# Patient Record
Sex: FEMALE | Race: WHITE | Employment: OTHER | URBAN - METROPOLITAN AREA
[De-identification: names, ages, dates, MRNs, and addresses within clinical notes are randomized per-mention and may not be internally consistent; named-entity substitution may affect disease eponyms.]

---

## 2017-01-01 ENCOUNTER — HOSPITAL ENCOUNTER (INPATIENT)
Facility: HOSPITAL | Age: 77
LOS: 21 days | Discharge: HOSPICE | DRG: 264 | End: 2017-01-01
Attending: EMERGENCY MEDICINE | Admitting: INTERNAL MEDICINE
Payer: MEDICARE

## 2017-01-01 ENCOUNTER — APPOINTMENT (OUTPATIENT)
Dept: CT IMAGING | Facility: HOSPITAL | Age: 77
DRG: 264 | End: 2017-01-01
Attending: INTERNAL MEDICINE
Payer: MEDICARE

## 2017-01-01 ENCOUNTER — APPOINTMENT (OUTPATIENT)
Dept: NUCLEAR MEDICINE | Facility: HOSPITAL | Age: 77
DRG: 264 | End: 2017-01-01
Attending: INTERNAL MEDICINE
Payer: MEDICARE

## 2017-01-01 ENCOUNTER — APPOINTMENT (OUTPATIENT)
Dept: CV DIAGNOSTICS | Facility: HOSPITAL | Age: 77
DRG: 264 | End: 2017-01-01
Attending: INTERNAL MEDICINE
Payer: MEDICARE

## 2017-01-01 ENCOUNTER — APPOINTMENT (OUTPATIENT)
Dept: CT IMAGING | Facility: HOSPITAL | Age: 77
DRG: 264 | End: 2017-01-01
Attending: RADIOLOGY
Payer: MEDICARE

## 2017-01-01 ENCOUNTER — TELEPHONE (OUTPATIENT)
Dept: INTERNAL MEDICINE CLINIC | Facility: CLINIC | Age: 77
End: 2017-01-01

## 2017-01-01 ENCOUNTER — APPOINTMENT (OUTPATIENT)
Dept: ULTRASOUND IMAGING | Facility: HOSPITAL | Age: 77
DRG: 264 | End: 2017-01-01
Attending: Other
Payer: MEDICARE

## 2017-01-01 ENCOUNTER — APPOINTMENT (OUTPATIENT)
Dept: GENERAL RADIOLOGY | Facility: HOSPITAL | Age: 77
DRG: 264 | End: 2017-01-01
Attending: EMERGENCY MEDICINE
Payer: MEDICARE

## 2017-01-01 ENCOUNTER — APPOINTMENT (OUTPATIENT)
Dept: ULTRASOUND IMAGING | Facility: HOSPITAL | Age: 77
DRG: 264 | End: 2017-01-01
Attending: INTERNAL MEDICINE
Payer: MEDICARE

## 2017-01-01 ENCOUNTER — SURGERY (OUTPATIENT)
Age: 77
End: 2017-01-01

## 2017-01-01 ENCOUNTER — APPOINTMENT (OUTPATIENT)
Dept: GENERAL RADIOLOGY | Facility: HOSPITAL | Age: 77
DRG: 264 | End: 2017-01-01
Attending: INTERNAL MEDICINE
Payer: MEDICARE

## 2017-01-01 ENCOUNTER — HOSPITAL ENCOUNTER (INPATIENT)
Facility: HOSPITAL | Age: 77
LOS: 4 days | DRG: 308 | End: 2017-01-01
Attending: INTERNAL MEDICINE | Admitting: INTERNAL MEDICINE
Payer: OTHER MISCELLANEOUS

## 2017-01-01 ENCOUNTER — ANESTHESIA (OUTPATIENT)
Dept: ENDOSCOPY | Facility: HOSPITAL | Age: 77
DRG: 264 | End: 2017-01-01
Payer: MEDICARE

## 2017-01-01 ENCOUNTER — APPOINTMENT (OUTPATIENT)
Dept: MRI IMAGING | Facility: HOSPITAL | Age: 77
DRG: 264 | End: 2017-01-01
Attending: Other
Payer: MEDICARE

## 2017-01-01 ENCOUNTER — APPOINTMENT (OUTPATIENT)
Dept: INTERVENTIONAL RADIOLOGY/VASCULAR | Facility: HOSPITAL | Age: 77
DRG: 264 | End: 2017-01-01
Attending: INTERNAL MEDICINE
Payer: MEDICARE

## 2017-01-01 ENCOUNTER — ANESTHESIA EVENT (OUTPATIENT)
Dept: ENDOSCOPY | Facility: HOSPITAL | Age: 77
DRG: 264 | End: 2017-01-01
Payer: MEDICARE

## 2017-01-01 VITALS
HEIGHT: 67 IN | BODY MASS INDEX: 24.66 KG/M2 | DIASTOLIC BLOOD PRESSURE: 51 MMHG | HEART RATE: 95 BPM | OXYGEN SATURATION: 96 % | TEMPERATURE: 98 F | RESPIRATION RATE: 18 BRPM | SYSTOLIC BLOOD PRESSURE: 118 MMHG | WEIGHT: 157.13 LBS

## 2017-01-01 VITALS
OXYGEN SATURATION: 90 % | RESPIRATION RATE: 16 BRPM | HEART RATE: 94 BPM | TEMPERATURE: 98 F | SYSTOLIC BLOOD PRESSURE: 97 MMHG | DIASTOLIC BLOOD PRESSURE: 41 MMHG

## 2017-01-01 DIAGNOSIS — I50.9 ACUTE ON CHRONIC CONGESTIVE HEART FAILURE, UNSPECIFIED CONGESTIVE HEART FAILURE TYPE: ICD-10-CM

## 2017-01-01 DIAGNOSIS — D64.9 ANEMIA, UNSPECIFIED TYPE: ICD-10-CM

## 2017-01-01 DIAGNOSIS — K63.5 COLON POLYP: ICD-10-CM

## 2017-01-01 DIAGNOSIS — K57.90 DIVERTICULOSIS: ICD-10-CM

## 2017-01-01 DIAGNOSIS — G45.1 HEMISPHERIC CAROTID ARTERY SYNDROME: ICD-10-CM

## 2017-01-01 DIAGNOSIS — I48.91 ATRIAL FIBRILLATION WITH RAPID VENTRICULAR RESPONSE (HCC): Primary | ICD-10-CM

## 2017-01-01 DIAGNOSIS — K63.89 COLONIC MASS: ICD-10-CM

## 2017-01-01 LAB
ALBUMIN SERPL BCP-MCNC: 1.9 G/DL (ref 3.5–4.8)
ALBUMIN SERPL BCP-MCNC: 2.1 G/DL (ref 3.5–4.8)
ALBUMIN SERPL BCP-MCNC: 2.1 G/DL (ref 3.5–4.8)
ALBUMIN/GLOB SERPL: 0.4 {RATIO} (ref 1–2)
ALBUMIN/GLOB SERPL: 0.4 {RATIO} (ref 1–2)
ALP SERPL-CCNC: 55 U/L (ref 32–100)
ALP SERPL-CCNC: 56 U/L (ref 32–100)
ALP SERPL-CCNC: 61 U/L (ref 32–100)
ALT SERPL-CCNC: 13 U/L (ref 14–54)
ALT SERPL-CCNC: 14 U/L (ref 14–54)
ALT SERPL-CCNC: 17 U/L (ref 14–54)
ANION GAP SERPL CALC-SCNC: 10 MMOL/L (ref 0–18)
ANION GAP SERPL CALC-SCNC: 10 MMOL/L (ref 0–18)
ANION GAP SERPL CALC-SCNC: 11 MMOL/L (ref 0–18)
ANION GAP SERPL CALC-SCNC: 13 MMOL/L (ref 0–18)
ANION GAP SERPL CALC-SCNC: 4 MMOL/L (ref 0–18)
ANION GAP SERPL CALC-SCNC: 5 MMOL/L (ref 0–18)
ANION GAP SERPL CALC-SCNC: 6 MMOL/L (ref 0–18)
ANION GAP SERPL CALC-SCNC: 6 MMOL/L (ref 0–18)
ANION GAP SERPL CALC-SCNC: 7 MMOL/L (ref 0–18)
ANION GAP SERPL CALC-SCNC: 8 MMOL/L (ref 0–18)
ANION GAP SERPL CALC-SCNC: 9 MMOL/L (ref 0–18)
ANION GAP SERPL CALC-SCNC: 9 MMOL/L (ref 0–18)
ANTIBODY SCREEN: NEGATIVE
APTT PPP: 130.9 SECONDS (ref 23.2–35.3)
APTT PPP: 40.3 SECONDS (ref 23.2–35.3)
APTT PPP: 40.5 SECONDS (ref 23.2–35.3)
APTT PPP: 40.6 SECONDS (ref 23.2–35.3)
APTT PPP: 42 SECONDS (ref 23.2–35.3)
APTT PPP: 57.3 SECONDS (ref 23.2–35.3)
APTT PPP: 80.7 SECONDS (ref 23.2–35.3)
AST SERPL-CCNC: 20 U/L (ref 15–41)
AST SERPL-CCNC: 22 U/L (ref 15–41)
AST SERPL-CCNC: 25 U/L (ref 15–41)
BACTERIA UR QL AUTO: NEGATIVE /HPF
BASOPHILS # BLD: 0 K/UL (ref 0–0.2)
BASOPHILS NFR BLD: 0 %
BASOPHILS NFR BLD: 1 %
BILIRUB DIRECT SERPL-MCNC: 0.3 MG/DL (ref 0–0.2)
BILIRUB SERPL-MCNC: 0.9 MG/DL (ref 0.3–1.2)
BILIRUB SERPL-MCNC: 0.9 MG/DL (ref 0.3–1.2)
BILIRUB SERPL-MCNC: 1.1 MG/DL (ref 0.3–1.2)
BILIRUB UR QL: NEGATIVE
BLOOD TYPE BARCODE: 8400
BNP SERPL-MCNC: 531 PG/ML (ref 0–100)
BUN SERPL-MCNC: 15 MG/DL (ref 8–20)
BUN SERPL-MCNC: 16 MG/DL (ref 8–20)
BUN SERPL-MCNC: 16 MG/DL (ref 8–20)
BUN SERPL-MCNC: 17 MG/DL (ref 8–20)
BUN SERPL-MCNC: 18 MG/DL (ref 8–20)
BUN SERPL-MCNC: 19 MG/DL (ref 8–20)
BUN SERPL-MCNC: 19 MG/DL (ref 8–20)
BUN SERPL-MCNC: 20 MG/DL (ref 8–20)
BUN SERPL-MCNC: 24 MG/DL (ref 8–20)
BUN SERPL-MCNC: 27 MG/DL (ref 8–20)
BUN SERPL-MCNC: 28 MG/DL (ref 8–20)
BUN SERPL-MCNC: 28 MG/DL (ref 8–20)
BUN SERPL-MCNC: 29 MG/DL (ref 8–20)
BUN SERPL-MCNC: 30 MG/DL (ref 8–20)
BUN SERPL-MCNC: 31 MG/DL (ref 8–20)
BUN/CREAT SERPL: 19 (ref 10–20)
BUN/CREAT SERPL: 21.7 (ref 10–20)
BUN/CREAT SERPL: 21.9 (ref 10–20)
BUN/CREAT SERPL: 22.4 (ref 10–20)
BUN/CREAT SERPL: 23.3 (ref 10–20)
BUN/CREAT SERPL: 23.5 (ref 10–20)
BUN/CREAT SERPL: 23.9 (ref 10–20)
BUN/CREAT SERPL: 24.3 (ref 10–20)
BUN/CREAT SERPL: 24.7 (ref 10–20)
BUN/CREAT SERPL: 26.1 (ref 10–20)
BUN/CREAT SERPL: 26.9 (ref 10–20)
BUN/CREAT SERPL: 27.3 (ref 10–20)
BUN/CREAT SERPL: 27.4 (ref 10–20)
BUN/CREAT SERPL: 28.6 (ref 10–20)
BUN/CREAT SERPL: 28.6 (ref 10–20)
C DIFF TOX B STL QL: NEGATIVE
CALCIUM SERPL-MCNC: 7.3 MG/DL (ref 8.5–10.5)
CALCIUM SERPL-MCNC: 7.5 MG/DL (ref 8.5–10.5)
CALCIUM SERPL-MCNC: 7.6 MG/DL (ref 8.5–10.5)
CALCIUM SERPL-MCNC: 7.7 MG/DL (ref 8.5–10.5)
CALCIUM SERPL-MCNC: 7.8 MG/DL (ref 8.5–10.5)
CALCIUM SERPL-MCNC: 7.8 MG/DL (ref 8.5–10.5)
CALCIUM SERPL-MCNC: 7.9 MG/DL (ref 8.5–10.5)
CALCIUM SERPL-MCNC: 8 MG/DL (ref 8.5–10.5)
CALCIUM SERPL-MCNC: 8.1 MG/DL (ref 8.5–10.5)
CD10 CELLS NFR SPEC: 18 %
CD11C CELLS NFR SPEC: 9 %
CD14 CELLS NFR SPEC: 6 %
CD19 CELLS NFR SPEC: 30 %
CD19/CD10 CELLS: 18 %
CD20 CELLS NFR SPEC: 27 %
CD22 CELLS NFR SPEC: 11 %
CD23 CELLS NFR SPEC: 2 %
CD3 CELLS NFR SPEC: 63 %
CD3+CD4+ CELLS NFR SPEC: 41 %
CD3+CD4+ CELLS/CD3+CD8+ CLL SPEC: 2
CD3+CD8+ CELLS NFR SPEC: 21 %
CD45 CELLS NFR SPEC: 100 %
CD5 CELLS NFR SPEC: 62 %
CD5/CD19 CELLS: 2 %
CD56 CELLS NFR SPEC: 10 %
CD7 CELLS NFR SPEC: 67 %
CELL SURF KAPPA/LAMBDA RATIO: 2.5
CELL SURF LAMBDA LIGHT CHAIN: 8 %
CELL SURFACE KAPPA LIGHT CHAIN: 20 %
CHLORIDE SERPL-SCNC: 100 MMOL/L (ref 95–110)
CHLORIDE SERPL-SCNC: 100 MMOL/L (ref 95–110)
CHLORIDE SERPL-SCNC: 101 MMOL/L (ref 95–110)
CHLORIDE SERPL-SCNC: 104 MMOL/L (ref 95–110)
CHLORIDE SERPL-SCNC: 94 MMOL/L (ref 95–110)
CHLORIDE SERPL-SCNC: 95 MMOL/L (ref 95–110)
CHLORIDE SERPL-SCNC: 96 MMOL/L (ref 95–110)
CHLORIDE SERPL-SCNC: 96 MMOL/L (ref 95–110)
CHLORIDE SERPL-SCNC: 97 MMOL/L (ref 95–110)
CHLORIDE SERPL-SCNC: 98 MMOL/L (ref 95–110)
CHLORIDE SERPL-SCNC: 99 MMOL/L (ref 95–110)
CHOLEST SERPL-MCNC: 80 MG/DL (ref 110–200)
CHOLEST SERPL-MCNC: 85 MG/DL (ref 110–200)
CLARITY UR: CLEAR
CO2 SERPL-SCNC: 24 MMOL/L (ref 22–32)
CO2 SERPL-SCNC: 25 MMOL/L (ref 22–32)
CO2 SERPL-SCNC: 27 MMOL/L (ref 22–32)
CO2 SERPL-SCNC: 28 MMOL/L (ref 22–32)
CO2 SERPL-SCNC: 28 MMOL/L (ref 22–32)
CO2 SERPL-SCNC: 30 MMOL/L (ref 22–32)
CO2 SERPL-SCNC: 31 MMOL/L (ref 22–32)
CO2 SERPL-SCNC: 33 MMOL/L (ref 22–32)
CO2 SERPL-SCNC: 34 MMOL/L (ref 22–32)
CO2 SERPL-SCNC: 36 MMOL/L (ref 22–32)
COLOR UR: YELLOW
CREAT SERPL-MCNC: 0.55 MG/DL (ref 0.5–1.5)
CREAT SERPL-MCNC: 0.62 MG/DL (ref 0.5–1.5)
CREAT SERPL-MCNC: 0.73 MG/DL (ref 0.5–1.5)
CREAT SERPL-MCNC: 0.81 MG/DL (ref 0.5–1.5)
CREAT SERPL-MCNC: 0.81 MG/DL (ref 0.5–1.5)
CREAT SERPL-MCNC: 0.83 MG/DL (ref 0.5–1.5)
CREAT SERPL-MCNC: 0.84 MG/DL (ref 0.5–1.5)
CREAT SERPL-MCNC: 0.85 MG/DL (ref 0.5–1.5)
CREAT SERPL-MCNC: 0.96 MG/DL (ref 0.5–1.5)
CREAT SERPL-MCNC: 0.98 MG/DL (ref 0.5–1.5)
CREAT SERPL-MCNC: 1.03 MG/DL (ref 0.5–1.5)
CREAT SERPL-MCNC: 1.05 MG/DL (ref 0.5–1.5)
CREAT SERPL-MCNC: 1.08 MG/DL (ref 0.5–1.5)
CREAT SERPL-MCNC: 1.13 MG/DL (ref 0.5–1.5)
CREAT SERPL-MCNC: 1.15 MG/DL (ref 0.5–1.5)
CREAT SERPL-MCNC: 1.19 MG/DL (ref 0.5–1.5)
DIGOXIN SERPL-MCNC: 1.7 NG/ML (ref 0.8–2.1)
DIGOXIN SERPL-MCNC: 1.9 NG/ML (ref 0.8–2.1)
EOSINOPHIL # BLD: 0 K/UL (ref 0–0.7)
EOSINOPHIL NFR BLD: 0 %
EOSINOPHIL NFR BLD: 1 %
ERYTHROCYTE [DISTWIDTH] IN BLOOD BY AUTOMATED COUNT: 23.6 % (ref 11–15)
ERYTHROCYTE [DISTWIDTH] IN BLOOD BY AUTOMATED COUNT: 23.9 % (ref 11–15)
ERYTHROCYTE [DISTWIDTH] IN BLOOD BY AUTOMATED COUNT: 28.3 % (ref 11–15)
ERYTHROCYTE [DISTWIDTH] IN BLOOD BY AUTOMATED COUNT: 28.3 % (ref 11–15)
ERYTHROCYTE [DISTWIDTH] IN BLOOD BY AUTOMATED COUNT: 28.6 % (ref 11–15)
ERYTHROCYTE [DISTWIDTH] IN BLOOD BY AUTOMATED COUNT: 28.6 % (ref 11–15)
ERYTHROCYTE [DISTWIDTH] IN BLOOD BY AUTOMATED COUNT: 29 % (ref 11–15)
ERYTHROCYTE [DISTWIDTH] IN BLOOD BY AUTOMATED COUNT: 29.1 % (ref 11–15)
ERYTHROCYTE [DISTWIDTH] IN BLOOD BY AUTOMATED COUNT: 29.1 % (ref 11–15)
ERYTHROCYTE [DISTWIDTH] IN BLOOD BY AUTOMATED COUNT: 29.4 % (ref 11–15)
ERYTHROCYTE [DISTWIDTH] IN BLOOD BY AUTOMATED COUNT: 29.5 % (ref 11–15)
ERYTHROCYTE [DISTWIDTH] IN BLOOD BY AUTOMATED COUNT: 29.5 % (ref 11–15)
ERYTHROCYTE [DISTWIDTH] IN BLOOD BY AUTOMATED COUNT: 29.7 % (ref 11–15)
ERYTHROCYTE [DISTWIDTH] IN BLOOD BY AUTOMATED COUNT: 29.8 % (ref 11–15)
ERYTHROCYTE [SEDIMENTATION RATE] IN BLOOD: 110 MM/HR (ref 0–30)
ERYTHROCYTE [SEDIMENTATION RATE] IN BLOOD: 86 MM/HR (ref 0–30)
FERRITIN SERPL IA-MCNC: 121 NG/ML (ref 11–307)
FMC7 CELLS NFR SPEC: 11 %
FOLATE SERPL-MCNC: 11.2 NG/ML
GLOBULIN PLAS-MCNC: 4.3 G/DL (ref 2.5–3.7)
GLOBULIN PLAS-MCNC: 4.7 G/DL (ref 2.5–3.7)
GLUCOSE BLDC GLUCOMTR-MCNC: 100 MG/DL (ref 70–99)
GLUCOSE BLDC GLUCOMTR-MCNC: 100 MG/DL (ref 70–99)
GLUCOSE BLDC GLUCOMTR-MCNC: 101 MG/DL (ref 70–99)
GLUCOSE BLDC GLUCOMTR-MCNC: 103 MG/DL (ref 70–99)
GLUCOSE BLDC GLUCOMTR-MCNC: 105 MG/DL (ref 70–99)
GLUCOSE BLDC GLUCOMTR-MCNC: 109 MG/DL (ref 70–99)
GLUCOSE BLDC GLUCOMTR-MCNC: 109 MG/DL (ref 70–99)
GLUCOSE BLDC GLUCOMTR-MCNC: 113 MG/DL (ref 70–99)
GLUCOSE BLDC GLUCOMTR-MCNC: 116 MG/DL (ref 70–99)
GLUCOSE BLDC GLUCOMTR-MCNC: 118 MG/DL (ref 70–99)
GLUCOSE BLDC GLUCOMTR-MCNC: 125 MG/DL (ref 70–99)
GLUCOSE BLDC GLUCOMTR-MCNC: 126 MG/DL (ref 70–99)
GLUCOSE BLDC GLUCOMTR-MCNC: 129 MG/DL (ref 70–99)
GLUCOSE BLDC GLUCOMTR-MCNC: 132 MG/DL (ref 70–99)
GLUCOSE BLDC GLUCOMTR-MCNC: 132 MG/DL (ref 70–99)
GLUCOSE BLDC GLUCOMTR-MCNC: 141 MG/DL (ref 70–99)
GLUCOSE BLDC GLUCOMTR-MCNC: 146 MG/DL (ref 70–99)
GLUCOSE BLDC GLUCOMTR-MCNC: 202 MG/DL (ref 70–99)
GLUCOSE BLDC GLUCOMTR-MCNC: 84 MG/DL (ref 70–99)
GLUCOSE BLDC GLUCOMTR-MCNC: 88 MG/DL (ref 70–99)
GLUCOSE BLDC GLUCOMTR-MCNC: 89 MG/DL (ref 70–99)
GLUCOSE BLDC GLUCOMTR-MCNC: 92 MG/DL (ref 70–99)
GLUCOSE BLDC GLUCOMTR-MCNC: 93 MG/DL (ref 70–99)
GLUCOSE BLDC GLUCOMTR-MCNC: 98 MG/DL (ref 70–99)
GLUCOSE BLDC GLUCOMTR-MCNC: 99 MG/DL (ref 70–99)
GLUCOSE SERPL-MCNC: 100 MG/DL (ref 70–99)
GLUCOSE SERPL-MCNC: 100 MG/DL (ref 70–99)
GLUCOSE SERPL-MCNC: 103 MG/DL (ref 70–99)
GLUCOSE SERPL-MCNC: 103 MG/DL (ref 70–99)
GLUCOSE SERPL-MCNC: 109 MG/DL (ref 70–99)
GLUCOSE SERPL-MCNC: 111 MG/DL (ref 70–99)
GLUCOSE SERPL-MCNC: 113 MG/DL (ref 70–99)
GLUCOSE SERPL-MCNC: 119 MG/DL (ref 70–99)
GLUCOSE SERPL-MCNC: 120 MG/DL (ref 70–99)
GLUCOSE SERPL-MCNC: 187 MG/DL (ref 70–99)
GLUCOSE SERPL-MCNC: 188 MG/DL (ref 70–99)
GLUCOSE SERPL-MCNC: 90 MG/DL (ref 70–99)
GLUCOSE SERPL-MCNC: 91 MG/DL (ref 70–99)
GLUCOSE SERPL-MCNC: 94 MG/DL (ref 70–99)
GLUCOSE SERPL-MCNC: 97 MG/DL (ref 70–99)
GLUCOSE UR-MCNC: NEGATIVE MG/DL
HBA1C MFR BLD: 5.1 % (ref 4–6)
HBA1C MFR BLD: 5.2 % (ref 4–6)
HCT VFR BLD AUTO: 22.7 % (ref 35–48)
HCT VFR BLD AUTO: 25.7 % (ref 35–48)
HCT VFR BLD AUTO: 26 % (ref 35–48)
HCT VFR BLD AUTO: 26.2 % (ref 35–48)
HCT VFR BLD AUTO: 26.5 % (ref 35–48)
HCT VFR BLD AUTO: 27.4 % (ref 35–48)
HCT VFR BLD AUTO: 27.7 % (ref 35–48)
HCT VFR BLD AUTO: 27.7 % (ref 35–48)
HCT VFR BLD AUTO: 27.9 % (ref 35–48)
HCT VFR BLD AUTO: 28.6 % (ref 35–48)
HCT VFR BLD AUTO: 29.2 % (ref 35–48)
HCT VFR BLD AUTO: 30.4 % (ref 35–48)
HCT VFR BLD AUTO: 30.5 % (ref 35–48)
HCT VFR BLD AUTO: 31.5 % (ref 35–48)
HCT VFR BLD AUTO: 32.1 % (ref 35–48)
HCT VFR BLD AUTO: 32.5 % (ref 35–48)
HCT VFR BLD AUTO: 32.5 % (ref 35–48)
HCT VFR BLD AUTO: 33.4 % (ref 35–48)
HDLC SERPL-MCNC: 13 MG/DL
HDLC SERPL-MCNC: 6 MG/DL
HGB BLD-MCNC: 10 G/DL (ref 12–16)
HGB BLD-MCNC: 10.2 G/DL (ref 12–16)
HGB BLD-MCNC: 6.6 G/DL (ref 12–16)
HGB BLD-MCNC: 7.7 G/DL (ref 12–16)
HGB BLD-MCNC: 8 G/DL (ref 12–16)
HGB BLD-MCNC: 8.4 G/DL (ref 12–16)
HGB BLD-MCNC: 8.4 G/DL (ref 12–16)
HGB BLD-MCNC: 8.5 G/DL (ref 12–16)
HGB BLD-MCNC: 8.6 G/DL (ref 12–16)
HGB BLD-MCNC: 8.8 G/DL (ref 12–16)
HGB BLD-MCNC: 8.8 G/DL (ref 12–16)
HGB BLD-MCNC: 9 G/DL (ref 12–16)
HGB BLD-MCNC: 9.3 G/DL (ref 12–16)
HGB BLD-MCNC: 9.5 G/DL (ref 12–16)
HGB BLD-MCNC: 9.7 G/DL (ref 12–16)
HGB BLD-MCNC: 9.9 G/DL (ref 12–16)
HGB UR QL STRIP.AUTO: NEGATIVE
HGB UR QL STRIP.AUTO: NEGATIVE
HYALINE CASTS #/AREA URNS AUTO: 1 /LPF
HYALINE CASTS #/AREA URNS AUTO: 20 /LPF
IRON SATN MFR SERPL: 11 % (ref 15–50)
IRON SATN MFR SERPL: 3 % (ref 15–50)
IRON SERPL-MCNC: 18 MCG/DL (ref 28–170)
IRON SERPL-MCNC: 6 MCG/DL (ref 28–170)
KETONES UR-MCNC: NEGATIVE MG/DL
LDH SERPL L TO P-CCNC: 235 U/L (ref 98–192)
LDLC SERPL CALC-MCNC: 54 MG/DL (ref 0–99)
LDLC SERPL CALC-MCNC: 55 MG/DL (ref 0–99)
LYMPHOCYTES # BLD: 0.2 K/UL (ref 1–4)
LYMPHOCYTES # BLD: 0.3 K/UL (ref 1–4)
LYMPHOCYTES # BLD: 0.4 K/UL (ref 1–4)
LYMPHOCYTES NFR BLD: 10 %
LYMPHOCYTES NFR BLD: 3 %
LYMPHOCYTES NFR BLD: 5 %
LYMPHOCYTES NFR BLD: 5 %
LYMPHOCYTES NFR BLD: 6 %
LYMPHOCYTES NFR BLD: 7 %
LYMPHOCYTES NFR BLD: 7 %
LYMPHOCYTES NFR BLD: 8 %
LYMPHOCYTES NFR BLD: 9 %
LYMPHOCYTES NFR BLD: 9 %
MAGNESIUM SERPL-MCNC: 1.5 MG/DL (ref 1.8–2.5)
MAGNESIUM SERPL-MCNC: 1.6 MG/DL (ref 1.8–2.5)
MAGNESIUM SERPL-MCNC: 1.7 MG/DL (ref 1.8–2.5)
MAGNESIUM SERPL-MCNC: 1.9 MG/DL (ref 1.8–2.5)
MCH RBC QN AUTO: 17.4 PG (ref 27–32)
MCH RBC QN AUTO: 17.5 PG (ref 27–32)
MCH RBC QN AUTO: 19.8 PG (ref 27–32)
MCH RBC QN AUTO: 19.9 PG (ref 27–32)
MCH RBC QN AUTO: 19.9 PG (ref 27–32)
MCH RBC QN AUTO: 20 PG (ref 27–32)
MCH RBC QN AUTO: 20.1 PG (ref 27–32)
MCH RBC QN AUTO: 20.3 PG (ref 27–32)
MCH RBC QN AUTO: 20.4 PG (ref 27–32)
MCH RBC QN AUTO: 20.4 PG (ref 27–32)
MCH RBC QN AUTO: 20.6 PG (ref 27–32)
MCH RBC QN AUTO: 20.7 PG (ref 27–32)
MCH RBC QN AUTO: 20.8 PG (ref 27–32)
MCHC RBC AUTO-ENTMCNC: 28.9 G/DL (ref 32–37)
MCHC RBC AUTO-ENTMCNC: 29 G/DL (ref 32–37)
MCHC RBC AUTO-ENTMCNC: 29.7 G/DL (ref 32–37)
MCHC RBC AUTO-ENTMCNC: 30 G/DL (ref 32–37)
MCHC RBC AUTO-ENTMCNC: 30.1 G/DL (ref 32–37)
MCHC RBC AUTO-ENTMCNC: 30.1 G/DL (ref 32–37)
MCHC RBC AUTO-ENTMCNC: 30.3 G/DL (ref 32–37)
MCHC RBC AUTO-ENTMCNC: 30.5 G/DL (ref 32–37)
MCHC RBC AUTO-ENTMCNC: 30.5 G/DL (ref 32–37)
MCHC RBC AUTO-ENTMCNC: 30.6 G/DL (ref 32–37)
MCHC RBC AUTO-ENTMCNC: 30.6 G/DL (ref 32–37)
MCHC RBC AUTO-ENTMCNC: 30.7 G/DL (ref 32–37)
MCHC RBC AUTO-ENTMCNC: 30.7 G/DL (ref 32–37)
MCHC RBC AUTO-ENTMCNC: 30.8 G/DL (ref 32–37)
MCHC RBC AUTO-ENTMCNC: 30.9 G/DL (ref 32–37)
MCHC RBC AUTO-ENTMCNC: 30.9 G/DL (ref 32–37)
MCHC RBC AUTO-ENTMCNC: 31 G/DL (ref 32–37)
MCV RBC AUTO: 60.2 FL (ref 80–100)
MCV RBC AUTO: 60.8 FL (ref 80–100)
MCV RBC AUTO: 64.4 FL (ref 80–100)
MCV RBC AUTO: 65.6 FL (ref 80–100)
MCV RBC AUTO: 65.8 FL (ref 80–100)
MCV RBC AUTO: 65.8 FL (ref 80–100)
MCV RBC AUTO: 65.9 FL (ref 80–100)
MCV RBC AUTO: 66 FL (ref 80–100)
MCV RBC AUTO: 66.2 FL (ref 80–100)
MCV RBC AUTO: 66.3 FL (ref 80–100)
MCV RBC AUTO: 66.4 FL (ref 80–100)
MCV RBC AUTO: 66.4 FL (ref 80–100)
MCV RBC AUTO: 66.5 FL (ref 80–100)
MCV RBC AUTO: 66.7 FL (ref 80–100)
MCV RBC AUTO: 67 FL (ref 80–100)
MCV RBC AUTO: 67.3 FL (ref 80–100)
MCV RBC AUTO: 67.3 FL (ref 80–100)
MONOCYTES # BLD: 0.5 K/UL (ref 0–1)
MONOCYTES # BLD: 0.6 K/UL (ref 0–1)
MONOCYTES # BLD: 0.7 K/UL (ref 0–1)
MONOCYTES # BLD: 0.7 K/UL (ref 0–1)
MONOCYTES # BLD: 0.8 K/UL (ref 0–1)
MONOCYTES # BLD: 0.8 K/UL (ref 0–1)
MONOCYTES # BLD: 0.9 K/UL (ref 0–1)
MONOCYTES # BLD: 1 K/UL (ref 0–1)
MONOCYTES NFR BLD: 15 %
MONOCYTES NFR BLD: 16 %
MONOCYTES NFR BLD: 16 %
MONOCYTES NFR BLD: 17 %
MONOCYTES NFR BLD: 17 %
MONOCYTES NFR BLD: 18 %
MONOCYTES NFR BLD: 18 %
MONOCYTES NFR BLD: 19 %
MONOCYTES NFR BLD: 19 %
MONOCYTES NFR BLD: 20 %
MONOCYTES NFR BLD: 21 %
MONOCYTES NFR BLD: 22 %
MONOCYTES NFR BLD: 23 %
NEUTROPHILS # BLD AUTO: 1.8 K/UL (ref 1.8–7.7)
NEUTROPHILS # BLD AUTO: 2 K/UL (ref 1.8–7.7)
NEUTROPHILS # BLD AUTO: 2.2 K/UL (ref 1.8–7.7)
NEUTROPHILS # BLD AUTO: 2.3 K/UL (ref 1.8–7.7)
NEUTROPHILS # BLD AUTO: 2.4 K/UL (ref 1.8–7.7)
NEUTROPHILS # BLD AUTO: 2.5 K/UL (ref 1.8–7.7)
NEUTROPHILS # BLD AUTO: 2.5 K/UL (ref 1.8–7.7)
NEUTROPHILS # BLD AUTO: 2.7 K/UL (ref 1.8–7.7)
NEUTROPHILS # BLD AUTO: 2.7 K/UL (ref 1.8–7.7)
NEUTROPHILS # BLD AUTO: 2.9 K/UL (ref 1.8–7.7)
NEUTROPHILS # BLD AUTO: 2.9 K/UL (ref 1.8–7.7)
NEUTROPHILS # BLD AUTO: 3 K/UL (ref 1.8–7.7)
NEUTROPHILS # BLD AUTO: 3 K/UL (ref 1.8–7.7)
NEUTROPHILS # BLD AUTO: 3.1 K/UL (ref 1.8–7.7)
NEUTROPHILS # BLD AUTO: 3.8 K/UL (ref 1.8–7.7)
NEUTROPHILS # BLD AUTO: 4.3 K/UL (ref 1.8–7.7)
NEUTROPHILS NFR BLD: 67 %
NEUTROPHILS NFR BLD: 68 %
NEUTROPHILS NFR BLD: 69 %
NEUTROPHILS NFR BLD: 70 %
NEUTROPHILS NFR BLD: 70 %
NEUTROPHILS NFR BLD: 71 %
NEUTROPHILS NFR BLD: 72 %
NEUTROPHILS NFR BLD: 72 %
NEUTROPHILS NFR BLD: 73 %
NEUTROPHILS NFR BLD: 74 %
NEUTROPHILS NFR BLD: 77 %
NEUTROPHILS NFR BLD: 77 %
NEUTROPHILS NFR BLD: 79 %
NEUTROPHILS NFR BLD: 79 %
NITRITE UR QL STRIP.AUTO: NEGATIVE
NONHDLC SERPL-MCNC: 72 MG/DL
NONHDLC SERPL-MCNC: 74 MG/DL
OSMOLALITY UR CALC.SUM OF ELEC: 277 MOSM/KG (ref 275–295)
OSMOLALITY UR CALC.SUM OF ELEC: 277 MOSM/KG (ref 275–295)
OSMOLALITY UR CALC.SUM OF ELEC: 278 MOSM/KG (ref 275–295)
OSMOLALITY UR CALC.SUM OF ELEC: 279 MOSM/KG (ref 275–295)
OSMOLALITY UR CALC.SUM OF ELEC: 280 MOSM/KG (ref 275–295)
OSMOLALITY UR CALC.SUM OF ELEC: 283 MOSM/KG (ref 275–295)
OSMOLALITY UR CALC.SUM OF ELEC: 287 MOSM/KG (ref 275–295)
OSMOLALITY UR CALC.SUM OF ELEC: 287 MOSM/KG (ref 275–295)
OSMOLALITY UR CALC.SUM OF ELEC: 288 MOSM/KG (ref 275–295)
OSMOLALITY UR CALC.SUM OF ELEC: 291 MOSM/KG (ref 275–295)
OSMOLALITY UR CALC.SUM OF ELEC: 292 MOSM/KG (ref 275–295)
OSMOLALITY UR CALC.SUM OF ELEC: 292 MOSM/KG (ref 275–295)
OSMOLALITY UR CALC.SUM OF ELEC: 295 MOSM/KG (ref 275–295)
PH UR: 6 [PH] (ref 5–8)
PH UR: 6 [PH] (ref 5–8)
PH UR: 7 [PH] (ref 5–8)
PLATELET # BLD AUTO: 141 K/UL (ref 140–400)
PLATELET # BLD AUTO: 141 K/UL (ref 140–400)
PLATELET # BLD AUTO: 143 K/UL (ref 140–400)
PLATELET # BLD AUTO: 145 K/UL (ref 140–400)
PLATELET # BLD AUTO: 146 K/UL (ref 140–400)
PLATELET # BLD AUTO: 155 K/UL (ref 140–400)
PLATELET # BLD AUTO: 155 K/UL (ref 140–400)
PLATELET # BLD AUTO: 163 K/UL (ref 140–400)
PLATELET # BLD AUTO: 172 K/UL (ref 140–400)
PLATELET # BLD AUTO: 173 K/UL (ref 140–400)
PLATELET # BLD AUTO: 183 K/UL (ref 140–400)
PLATELET # BLD AUTO: 183 K/UL (ref 140–400)
PLATELET # BLD AUTO: 189 K/UL (ref 140–400)
PLATELET # BLD AUTO: 189 K/UL (ref 140–400)
PLATELET # BLD AUTO: 196 K/UL (ref 140–400)
PLATELET # BLD AUTO: 204 K/UL (ref 140–400)
PLATELET # BLD AUTO: 207 K/UL (ref 140–400)
PMV BLD AUTO: 10.1 FL (ref 7.4–10.3)
PMV BLD AUTO: 10.2 FL (ref 7.4–10.3)
PMV BLD AUTO: 10.3 FL (ref 7.4–10.3)
PMV BLD AUTO: 10.4 FL (ref 7.4–10.3)
PMV BLD AUTO: 10.4 FL (ref 7.4–10.3)
PMV BLD AUTO: 10.6 FL (ref 7.4–10.3)
PMV BLD AUTO: 10.8 FL (ref 7.4–10.3)
PMV BLD AUTO: 9.3 FL (ref 7.4–10.3)
PMV BLD AUTO: 9.6 FL (ref 7.4–10.3)
PMV BLD AUTO: 9.6 FL (ref 7.4–10.3)
PMV BLD AUTO: 9.7 FL (ref 7.4–10.3)
PMV BLD AUTO: 9.8 FL (ref 7.4–10.3)
PMV BLD AUTO: 9.8 FL (ref 7.4–10.3)
PMV BLD AUTO: 9.9 FL (ref 7.4–10.3)
POTASSIUM SERPL-SCNC: 2.9 MMOL/L (ref 3.3–5.1)
POTASSIUM SERPL-SCNC: 3.1 MMOL/L (ref 3.3–5.1)
POTASSIUM SERPL-SCNC: 3.2 MMOL/L (ref 3.3–5.1)
POTASSIUM SERPL-SCNC: 3.3 MMOL/L (ref 3.3–5.1)
POTASSIUM SERPL-SCNC: 3.4 MMOL/L (ref 3.3–5.1)
POTASSIUM SERPL-SCNC: 3.4 MMOL/L (ref 3.3–5.1)
POTASSIUM SERPL-SCNC: 3.5 MMOL/L (ref 3.3–5.1)
POTASSIUM SERPL-SCNC: 3.6 MMOL/L (ref 3.3–5.1)
POTASSIUM SERPL-SCNC: 3.6 MMOL/L (ref 3.3–5.1)
POTASSIUM SERPL-SCNC: 3.7 MMOL/L (ref 3.3–5.1)
POTASSIUM SERPL-SCNC: 3.8 MMOL/L (ref 3.3–5.1)
POTASSIUM SERPL-SCNC: 3.8 MMOL/L (ref 3.3–5.1)
POTASSIUM SERPL-SCNC: 3.9 MMOL/L (ref 3.3–5.1)
POTASSIUM SERPL-SCNC: 4 MMOL/L (ref 3.3–5.1)
POTASSIUM SERPL-SCNC: 4.2 MMOL/L (ref 3.3–5.1)
POTASSIUM SERPL-SCNC: 4.5 MMOL/L (ref 3.3–5.1)
PROT SERPL-MCNC: 6.2 G/DL (ref 5.9–8.4)
PROT SERPL-MCNC: 6.8 G/DL (ref 5.9–8.4)
PROT SERPL-MCNC: 6.8 G/DL (ref 5.9–8.4)
PROT UR-MCNC: NEGATIVE MG/DL
RBC # BLD AUTO: 3.77 M/UL (ref 3.7–5.4)
RBC # BLD AUTO: 3.82 M/UL (ref 3.7–5.4)
RBC # BLD AUTO: 3.88 M/UL (ref 3.7–5.4)
RBC # BLD AUTO: 3.91 M/UL (ref 3.7–5.4)
RBC # BLD AUTO: 4.14 M/UL (ref 3.7–5.4)
RBC # BLD AUTO: 4.16 M/UL (ref 3.7–5.4)
RBC # BLD AUTO: 4.17 M/UL (ref 3.7–5.4)
RBC # BLD AUTO: 4.22 M/UL (ref 3.7–5.4)
RBC # BLD AUTO: 4.31 M/UL (ref 3.7–5.4)
RBC # BLD AUTO: 4.37 M/UL (ref 3.7–5.4)
RBC # BLD AUTO: 4.41 M/UL (ref 3.7–5.4)
RBC # BLD AUTO: 4.55 M/UL (ref 3.7–5.4)
RBC # BLD AUTO: 4.64 M/UL (ref 3.7–5.4)
RBC # BLD AUTO: 4.78 M/UL (ref 3.7–5.4)
RBC # BLD AUTO: 4.85 M/UL (ref 3.7–5.4)
RBC # BLD AUTO: 4.94 M/UL (ref 3.7–5.4)
RBC # BLD AUTO: 5.05 M/UL (ref 3.7–5.4)
RBC #/AREA URNS AUTO: 2 /HPF
RH BLOOD TYPE: POSITIVE
SODIUM SERPL-SCNC: 132 MMOL/L (ref 136–144)
SODIUM SERPL-SCNC: 133 MMOL/L (ref 136–144)
SODIUM SERPL-SCNC: 133 MMOL/L (ref 136–144)
SODIUM SERPL-SCNC: 134 MMOL/L (ref 136–144)
SODIUM SERPL-SCNC: 134 MMOL/L (ref 136–144)
SODIUM SERPL-SCNC: 135 MMOL/L (ref 136–144)
SODIUM SERPL-SCNC: 135 MMOL/L (ref 136–144)
SODIUM SERPL-SCNC: 136 MMOL/L (ref 136–144)
SODIUM SERPL-SCNC: 137 MMOL/L (ref 136–144)
SODIUM SERPL-SCNC: 138 MMOL/L (ref 136–144)
SODIUM SERPL-SCNC: 140 MMOL/L (ref 136–144)
SP GR UR STRIP: 1.01 (ref 1–1.03)
TIBC SERPL-MCNC: 162 MCG/DL (ref 228–428)
TIBC SERPL-MCNC: 202 MCG/DL (ref 228–428)
TRANSFERRIN SERPL-MCNC: 123 MG/DL (ref 192–382)
TRANSFERRIN SERPL-MCNC: 123 MG/DL (ref 192–382)
TRANSFERRIN SERPL-MCNC: 153 MG/DL (ref 192–382)
TRIGL SERPL-MCNC: 100 MG/DL (ref 1–149)
TRIGL SERPL-MCNC: 86 MG/DL (ref 1–149)
TROPONIN I SERPL-MCNC: 0.01 NG/ML (ref ?–0.03)
TROPONIN I SERPL-MCNC: 0.01 NG/ML (ref ?–0.03)
TSH SERPL-ACNC: 4.69 UIU/ML (ref 0.34–5.6)
UROBILINOGEN UR STRIP-ACNC: <2
VIT B12 SERPL-MCNC: >1500 PG/ML (ref 181–914)
VIT C UR-MCNC: NEGATIVE MG/DL
WBC # BLD AUTO: 2.7 K/UL (ref 4–11)
WBC # BLD AUTO: 3 K/UL (ref 4–11)
WBC # BLD AUTO: 3.1 K/UL (ref 4–11)
WBC # BLD AUTO: 3.2 K/UL (ref 4–11)
WBC # BLD AUTO: 3.3 K/UL (ref 4–11)
WBC # BLD AUTO: 3.5 K/UL (ref 4–11)
WBC # BLD AUTO: 3.7 K/UL (ref 4–11)
WBC # BLD AUTO: 3.8 K/UL (ref 4–11)
WBC # BLD AUTO: 3.9 K/UL (ref 4–11)
WBC # BLD AUTO: 4.2 K/UL (ref 4–11)
WBC # BLD AUTO: 4.3 K/UL (ref 4–11)
WBC # BLD AUTO: 4.8 K/UL (ref 4–11)
WBC # BLD AUTO: 5.4 K/UL (ref 4–11)
WBC #/AREA URNS AUTO: 1 /HPF
WBC #/AREA URNS AUTO: 1 /HPF
WBC #/AREA URNS AUTO: 5 /HPF
WBC #/AREA URNS AUTO: 9 /HPF

## 2017-01-01 PROCEDURE — 93018 CV STRESS TEST I&R ONLY: CPT | Performed by: NUCLEAR MEDICINE

## 2017-01-01 PROCEDURE — 99233 SBSQ HOSP IP/OBS HIGH 50: CPT | Performed by: INTERNAL MEDICINE

## 2017-01-01 PROCEDURE — 99232 SBSQ HOSP IP/OBS MODERATE 35: CPT | Performed by: INTERNAL MEDICINE

## 2017-01-01 PROCEDURE — 93018 CV STRESS TEST I&R ONLY: CPT | Performed by: INTERNAL MEDICINE

## 2017-01-01 PROCEDURE — 70450 CT HEAD/BRAIN W/O DYE: CPT

## 2017-01-01 PROCEDURE — 71260 CT THORAX DX C+: CPT

## 2017-01-01 PROCEDURE — 99231 SBSQ HOSP IP/OBS SF/LOW 25: CPT | Performed by: INTERNAL MEDICINE

## 2017-01-01 PROCEDURE — 70551 MRI BRAIN STEM W/O DYE: CPT

## 2017-01-01 PROCEDURE — 94729 DIFFUSING CAPACITY: CPT | Performed by: INTERNAL MEDICINE

## 2017-01-01 PROCEDURE — 93880 EXTRACRANIAL BILAT STUDY: CPT

## 2017-01-01 PROCEDURE — 71020 XR CHEST PA + LAT CHEST (CPT=71020): CPT

## 2017-01-01 PROCEDURE — 0DBM8ZZ EXCISION OF DESCENDING COLON, VIA NATURAL OR ARTIFICIAL OPENING ENDOSCOPIC: ICD-10-PCS | Performed by: INTERNAL MEDICINE

## 2017-01-01 PROCEDURE — 93017 CV STRESS TEST TRACING ONLY: CPT

## 2017-01-01 PROCEDURE — 99223 1ST HOSP IP/OBS HIGH 75: CPT | Performed by: NURSE PRACTITIONER

## 2017-01-01 PROCEDURE — 99223 1ST HOSP IP/OBS HIGH 75: CPT | Performed by: INTERNAL MEDICINE

## 2017-01-01 PROCEDURE — 76856 US EXAM PELVIC COMPLETE: CPT

## 2017-01-01 PROCEDURE — 99223 1ST HOSP IP/OBS HIGH 75: CPT | Performed by: OTHER

## 2017-01-01 PROCEDURE — 99222 1ST HOSP IP/OBS MODERATE 55: CPT | Performed by: INTERNAL MEDICINE

## 2017-01-01 PROCEDURE — 0DBK8ZZ EXCISION OF ASCENDING COLON, VIA NATURAL OR ARTIFICIAL OPENING ENDOSCOPIC: ICD-10-PCS | Performed by: INTERNAL MEDICINE

## 2017-01-01 PROCEDURE — B5181ZA FLUOROSCOPY OF SUPERIOR VENA CAVA USING LOW OSMOLAR CONTRAST, GUIDANCE: ICD-10-PCS | Performed by: INTERNAL MEDICINE

## 2017-01-01 PROCEDURE — 99238 HOSP IP/OBS DSCHRG MGMT 30/<: CPT | Performed by: INTERNAL MEDICINE

## 2017-01-01 PROCEDURE — 93016 CV STRESS TEST SUPVJ ONLY: CPT | Performed by: INTERNAL MEDICINE

## 2017-01-01 PROCEDURE — 94060 EVALUATION OF WHEEZING: CPT | Performed by: INTERNAL MEDICINE

## 2017-01-01 PROCEDURE — 77012 CT SCAN FOR NEEDLE BIOPSY: CPT

## 2017-01-01 PROCEDURE — 07BC3ZX EXCISION OF PELVIS LYMPHATIC, PERCUTANEOUS APPROACH, DIAGNOSTIC: ICD-10-PCS | Performed by: RADIOLOGY

## 2017-01-01 PROCEDURE — 38505 NEEDLE BIOPSY LYMPH NODES: CPT

## 2017-01-01 PROCEDURE — 99232 SBSQ HOSP IP/OBS MODERATE 35: CPT | Performed by: OTHER

## 2017-01-01 PROCEDURE — 99233 SBSQ HOSP IP/OBS HIGH 50: CPT | Performed by: NURSE PRACTITIONER

## 2017-01-01 PROCEDURE — 93306 TTE W/DOPPLER COMPLETE: CPT | Performed by: INTERNAL MEDICINE

## 2017-01-01 PROCEDURE — 93306 TTE W/DOPPLER COMPLETE: CPT

## 2017-01-01 PROCEDURE — 74177 CT ABD & PELVIS W/CONTRAST: CPT

## 2017-01-01 PROCEDURE — 49180 BIOPSY ABDOMINAL MASS: CPT

## 2017-01-01 PROCEDURE — 02HV33Z INSERTION OF INFUSION DEVICE INTO SUPERIOR VENA CAVA, PERCUTANEOUS APPROACH: ICD-10-PCS | Performed by: INTERNAL MEDICINE

## 2017-01-01 PROCEDURE — 0DBP8ZX EXCISION OF RECTUM, VIA NATURAL OR ARTIFICIAL OPENING ENDOSCOPIC, DIAGNOSTIC: ICD-10-PCS | Performed by: INTERNAL MEDICINE

## 2017-01-01 PROCEDURE — 78452 HT MUSCLE IMAGE SPECT MULT: CPT

## 2017-01-01 PROCEDURE — 71010 XR CHEST AP PORTABLE  (CPT=71010): CPT

## 2017-01-01 PROCEDURE — 0DBN8ZX EXCISION OF SIGMOID COLON, VIA NATURAL OR ARTIFICIAL OPENING ENDOSCOPIC, DIAGNOSTIC: ICD-10-PCS | Performed by: INTERNAL MEDICINE

## 2017-01-01 PROCEDURE — 94726 PLETHYSMOGRAPHY LUNG VOLUMES: CPT | Performed by: INTERNAL MEDICINE

## 2017-01-01 RX ORDER — ASPIRIN 81 MG/1
81 TABLET ORAL DAILY
Status: DISCONTINUED | OUTPATIENT
Start: 2017-01-01 | End: 2017-01-01

## 2017-01-01 RX ORDER — ONDANSETRON 4 MG/1
4 TABLET, ORALLY DISINTEGRATING ORAL EVERY 4 HOURS PRN
Status: DISCONTINUED | OUTPATIENT
Start: 2017-01-01 | End: 2017-01-01

## 2017-01-01 RX ORDER — IPRATROPIUM BROMIDE AND ALBUTEROL SULFATE 2.5; .5 MG/3ML; MG/3ML
3 SOLUTION RESPIRATORY (INHALATION)
Status: DISCONTINUED | OUTPATIENT
Start: 2017-01-01 | End: 2017-01-01

## 2017-01-01 RX ORDER — FUROSEMIDE 40 MG/1
40 TABLET ORAL
Status: DISCONTINUED | OUTPATIENT
Start: 2017-01-01 | End: 2017-01-01

## 2017-01-01 RX ORDER — METOLAZONE 2.5 MG/1
2.5 TABLET ORAL DAILY
Status: DISCONTINUED | OUTPATIENT
Start: 2017-01-01 | End: 2017-01-01

## 2017-01-01 RX ORDER — LORAZEPAM 2 MG/ML
1 INJECTION INTRAMUSCULAR EVERY 4 HOURS PRN
Status: DISCONTINUED | OUTPATIENT
Start: 2017-01-01 | End: 2017-01-01

## 2017-01-01 RX ORDER — METOLAZONE 2.5 MG/1
2.5 TABLET ORAL ONCE
Status: COMPLETED | OUTPATIENT
Start: 2017-01-01 | End: 2017-01-01

## 2017-01-01 RX ORDER — MIDAZOLAM HYDROCHLORIDE 1 MG/ML
INJECTION INTRAMUSCULAR; INTRAVENOUS
Status: COMPLETED
Start: 2017-01-01 | End: 2017-01-01

## 2017-01-01 RX ORDER — DEXTROSE MONOHYDRATE 25 G/50ML
50 INJECTION, SOLUTION INTRAVENOUS AS NEEDED
Status: DISCONTINUED | OUTPATIENT
Start: 2017-01-01 | End: 2017-01-01

## 2017-01-01 RX ORDER — MAGNESIUM OXIDE 400 MG (241.3 MG MAGNESIUM) TABLET
800 TABLET ONCE
Status: COMPLETED | OUTPATIENT
Start: 2017-01-01 | End: 2017-01-01

## 2017-01-01 RX ORDER — MAGNESIUM OXIDE 400 MG (241.3 MG MAGNESIUM) TABLET
400 TABLET ONCE
Status: COMPLETED | OUTPATIENT
Start: 2017-01-01 | End: 2017-01-01

## 2017-01-01 RX ORDER — DILTIAZEM HYDROCHLORIDE 60 MG/1
60 TABLET, FILM COATED ORAL AS NEEDED
Status: DISCONTINUED | OUTPATIENT
Start: 2017-01-01 | End: 2017-01-01

## 2017-01-01 RX ORDER — SODIUM CHLORIDE 0.9 % (FLUSH) 0.9 %
SYRINGE (ML) INJECTION
Status: COMPLETED
Start: 2017-01-01 | End: 2017-01-01

## 2017-01-01 RX ORDER — DOCUSATE SODIUM 100 MG/1
100 CAPSULE, LIQUID FILLED ORAL 2 TIMES DAILY
Status: DISCONTINUED | OUTPATIENT
Start: 2017-01-01 | End: 2017-01-01

## 2017-01-01 RX ORDER — FUROSEMIDE 10 MG/ML
40 INJECTION INTRAMUSCULAR; INTRAVENOUS
Status: DISCONTINUED | OUTPATIENT
Start: 2017-01-01 | End: 2017-01-01

## 2017-01-01 RX ORDER — MIDAZOLAM HYDROCHLORIDE 1 MG/ML
INJECTION INTRAMUSCULAR; INTRAVENOUS
Status: DISCONTINUED
Start: 2017-01-01 | End: 2017-01-01

## 2017-01-01 RX ORDER — 0.9 % SODIUM CHLORIDE 0.9 %
3 VIAL (ML) INJECTION AS NEEDED
Status: DISCONTINUED | OUTPATIENT
Start: 2017-01-01 | End: 2017-01-01

## 2017-01-01 RX ORDER — SODIUM CHLORIDE 9 MG/ML
INJECTION, SOLUTION INTRAVENOUS CONTINUOUS
Status: DISCONTINUED | OUTPATIENT
Start: 2017-01-01 | End: 2017-01-01

## 2017-01-01 RX ORDER — SCOLOPAMINE TRANSDERMAL SYSTEM 1 MG/1
1 PATCH, EXTENDED RELEASE TRANSDERMAL
Status: DISCONTINUED | OUTPATIENT
Start: 2017-01-01 | End: 2017-01-01

## 2017-01-01 RX ORDER — MAGNESIUM OXIDE 400 MG (241.3 MG MAGNESIUM) TABLET
400 TABLET ONCE
Status: DISCONTINUED | OUTPATIENT
Start: 2017-01-01 | End: 2017-01-01

## 2017-01-01 RX ORDER — DILTIAZEM HYDROCHLORIDE 5 MG/ML
INJECTION INTRAVENOUS
Status: COMPLETED
Start: 2017-01-01 | End: 2017-01-01

## 2017-01-01 RX ORDER — POTASSIUM CHLORIDE 14.9 MG/ML
20 INJECTION INTRAVENOUS ONCE
Status: COMPLETED | OUTPATIENT
Start: 2017-01-01 | End: 2017-01-01

## 2017-01-01 RX ORDER — METOLAZONE 2.5 MG/1
2.5 TABLET ORAL
Status: DISCONTINUED | OUTPATIENT
Start: 2017-01-01 | End: 2017-01-01

## 2017-01-01 RX ORDER — 0.9 % SODIUM CHLORIDE 0.9 %
VIAL (ML) INJECTION
Status: DISPENSED
Start: 2017-01-01 | End: 2017-01-01

## 2017-01-01 RX ORDER — CIPROFLOXACIN 500 MG/1
500 TABLET, FILM COATED ORAL EVERY 12 HOURS SCHEDULED
Qty: 6 TABLET | Refills: 0 | Status: SHIPPED | OUTPATIENT
Start: 2017-01-01 | End: 2017-01-01

## 2017-01-01 RX ORDER — GLYCOPYRROLATE 0.2 MG/ML
0.4 INJECTION, SOLUTION INTRAMUSCULAR; INTRAVENOUS
Status: DISCONTINUED | OUTPATIENT
Start: 2017-01-01 | End: 2017-01-01

## 2017-01-01 RX ORDER — HEPARIN SODIUM (PORCINE) LOCK FLUSH IV SOLN 100 UNIT/ML 100 UNIT/ML
1.5 SOLUTION INTRAVENOUS ONCE
Status: DISCONTINUED | OUTPATIENT
Start: 2017-01-01 | End: 2017-01-01

## 2017-01-01 RX ORDER — MIDAZOLAM HYDROCHLORIDE 1 MG/ML
INJECTION INTRAMUSCULAR; INTRAVENOUS AS NEEDED
Status: DISCONTINUED | OUTPATIENT
Start: 2017-01-01 | End: 2017-01-01 | Stop reason: SURG

## 2017-01-01 RX ORDER — POLYETHYLENE GLYCOL 3350 17 G/17G
17 POWDER, FOR SOLUTION ORAL DAILY
Qty: 7 EACH | Refills: 0 | Status: SHIPPED | OUTPATIENT
Start: 2017-01-01 | End: 2017-03-06

## 2017-01-01 RX ORDER — SODIUM CHLORIDE 9 MG/ML
INJECTION, SOLUTION INTRAVENOUS ONCE
Status: COMPLETED | OUTPATIENT
Start: 2017-01-01 | End: 2017-01-01

## 2017-01-01 RX ORDER — ACETAMINOPHEN 325 MG/1
650 TABLET ORAL EVERY 6 HOURS PRN
Status: DISCONTINUED | OUTPATIENT
Start: 2017-01-01 | End: 2017-01-01

## 2017-01-01 RX ORDER — SODIUM CHLORIDE 0.9 % (FLUSH) 0.9 %
10 SYRINGE (ML) INJECTION AS NEEDED
Status: DISCONTINUED | OUTPATIENT
Start: 2017-01-01 | End: 2017-01-01 | Stop reason: HOSPADM

## 2017-01-01 RX ORDER — IPRATROPIUM BROMIDE AND ALBUTEROL SULFATE 2.5; .5 MG/3ML; MG/3ML
3 SOLUTION RESPIRATORY (INHALATION) EVERY 4 HOURS PRN
Status: DISCONTINUED | OUTPATIENT
Start: 2017-01-01 | End: 2017-01-01

## 2017-01-01 RX ORDER — POTASSIUM CHLORIDE 20 MEQ/1
20 TABLET, EXTENDED RELEASE ORAL DAILY
Qty: 30 TABLET | Refills: 0 | Status: SHIPPED | OUTPATIENT
Start: 2017-01-01

## 2017-01-01 RX ORDER — MORPHINE SULFATE IN 0.9 % NACL 1 MG/ML
1 PLASTIC BAG, INJECTION (ML) INTRAVENOUS CONTINUOUS PRN
Status: DISCONTINUED | OUTPATIENT
Start: 2017-01-01 | End: 2017-01-01

## 2017-01-01 RX ORDER — POLYETHYLENE GLYCOL 3350 17 G/17G
17 POWDER, FOR SOLUTION ORAL DAILY
Status: DISCONTINUED | OUTPATIENT
Start: 2017-01-01 | End: 2017-01-01

## 2017-01-01 RX ORDER — POTASSIUM CHLORIDE 29.8 MG/ML
40 INJECTION INTRAVENOUS ONCE
Status: COMPLETED | OUTPATIENT
Start: 2017-01-01 | End: 2017-01-01

## 2017-01-01 RX ORDER — SODIUM CHLORIDE 9 MG/ML
INJECTION, SOLUTION INTRAVENOUS
Status: COMPLETED
Start: 2017-01-01 | End: 2017-01-01

## 2017-01-01 RX ORDER — POTASSIUM CHLORIDE 20 MEQ/1
40 TABLET, EXTENDED RELEASE ORAL ONCE
Status: DISCONTINUED | OUTPATIENT
Start: 2017-01-01 | End: 2017-01-01

## 2017-01-01 RX ORDER — POTASSIUM CHLORIDE 14.9 MG/ML
INJECTION INTRAVENOUS
Status: COMPLETED
Start: 2017-01-01 | End: 2017-01-01

## 2017-01-01 RX ORDER — METOLAZONE 2.5 MG/1
2.5 TABLET ORAL DAILY
Status: COMPLETED | OUTPATIENT
Start: 2017-01-01 | End: 2017-01-01

## 2017-01-01 RX ORDER — HYOSCYAMINE SULFATE 0.125 MG
0.12 TABLET,DISINTEGRATING ORAL 4 TIMES DAILY PRN
Status: DISCONTINUED | OUTPATIENT
Start: 2017-01-01 | End: 2017-01-01

## 2017-01-01 RX ORDER — BACITRACIN 50000 [USP'U]/1
INJECTION, POWDER, LYOPHILIZED, FOR SOLUTION INTRAMUSCULAR
Status: COMPLETED
Start: 2017-01-01 | End: 2017-01-01

## 2017-01-01 RX ORDER — LORAZEPAM 2 MG/ML
0.5 INJECTION INTRAMUSCULAR EVERY 4 HOURS PRN
Status: DISCONTINUED | OUTPATIENT
Start: 2017-01-01 | End: 2017-01-01

## 2017-01-01 RX ORDER — 0.9 % SODIUM CHLORIDE 0.9 %
VIAL (ML) INJECTION
Status: COMPLETED
Start: 2017-01-01 | End: 2017-01-01

## 2017-01-01 RX ORDER — ASPIRIN 81 MG/1
81 TABLET ORAL DAILY
Qty: 30 TABLET | Refills: 0 | Status: SHIPPED | OUTPATIENT
Start: 2017-01-01

## 2017-01-01 RX ORDER — MORPHINE SULFATE 2 MG/ML
1 INJECTION, SOLUTION INTRAMUSCULAR; INTRAVENOUS
Status: DISCONTINUED | OUTPATIENT
Start: 2017-01-01 | End: 2017-01-01

## 2017-01-01 RX ORDER — ACETAMINOPHEN 160 MG/5ML
650 SOLUTION ORAL EVERY 6 HOURS PRN
Status: DISCONTINUED | OUTPATIENT
Start: 2017-01-01 | End: 2017-01-01

## 2017-01-01 RX ORDER — HALOPERIDOL 5 MG/ML
1 INJECTION INTRAMUSCULAR
Status: DISCONTINUED | OUTPATIENT
Start: 2017-01-01 | End: 2017-01-01

## 2017-01-01 RX ORDER — DILTIAZEM HYDROCHLORIDE 5 MG/ML
10 INJECTION INTRAVENOUS
Status: DISCONTINUED | OUTPATIENT
Start: 2017-01-01 | End: 2017-01-01

## 2017-01-01 RX ORDER — ZOLPIDEM TARTRATE 5 MG/1
5 TABLET ORAL NIGHTLY PRN
Status: DISCONTINUED | OUTPATIENT
Start: 2017-01-01 | End: 2017-01-01

## 2017-01-01 RX ORDER — HEPARIN SODIUM AND DEXTROSE 10000; 5 [USP'U]/100ML; G/100ML
12 INJECTION INTRAVENOUS ONCE
Status: COMPLETED | OUTPATIENT
Start: 2017-01-01 | End: 2017-01-01

## 2017-01-01 RX ORDER — POTASSIUM CHLORIDE 20 MEQ/1
40 TABLET, EXTENDED RELEASE ORAL EVERY 4 HOURS
Status: COMPLETED | OUTPATIENT
Start: 2017-01-01 | End: 2017-01-01

## 2017-01-01 RX ORDER — DIGOXIN 125 MCG
125 TABLET ORAL
Qty: 12 TABLET | Refills: 0 | Status: SHIPPED | OUTPATIENT
Start: 2017-01-01

## 2017-01-01 RX ORDER — ACETAMINOPHEN 650 MG/1
650 SUPPOSITORY RECTAL EVERY 6 HOURS SCHEDULED
Status: DISCONTINUED | OUTPATIENT
Start: 2017-01-01 | End: 2017-01-01

## 2017-01-01 RX ORDER — MIDAZOLAM HYDROCHLORIDE 1 MG/ML
4 INJECTION INTRAMUSCULAR; INTRAVENOUS ONCE
Status: COMPLETED | OUTPATIENT
Start: 2017-01-01 | End: 2017-01-01

## 2017-01-01 RX ORDER — DIGOXIN 0.25 MG/ML
250 INJECTION INTRAMUSCULAR; INTRAVENOUS
Status: DISPENSED | OUTPATIENT
Start: 2017-01-01 | End: 2017-01-01

## 2017-01-01 RX ORDER — CIPROFLOXACIN 250 MG/1
250 TABLET, FILM COATED ORAL EVERY 12 HOURS SCHEDULED
Status: DISCONTINUED | OUTPATIENT
Start: 2017-01-01 | End: 2017-01-01

## 2017-01-01 RX ORDER — FUROSEMIDE 10 MG/ML
40 INJECTION INTRAMUSCULAR; INTRAVENOUS 3 TIMES DAILY
Status: DISCONTINUED | OUTPATIENT
Start: 2017-01-01 | End: 2017-01-01

## 2017-01-01 RX ORDER — LORAZEPAM 2 MG/ML
2 INJECTION INTRAMUSCULAR EVERY 4 HOURS PRN
Status: DISCONTINUED | OUTPATIENT
Start: 2017-01-01 | End: 2017-01-01

## 2017-01-01 RX ORDER — HEPARIN SODIUM 1000 [USP'U]/ML
60 INJECTION, SOLUTION INTRAVENOUS; SUBCUTANEOUS ONCE
Status: COMPLETED | OUTPATIENT
Start: 2017-01-01 | End: 2017-01-01

## 2017-01-01 RX ORDER — HALOPERIDOL 5 MG/ML
2 INJECTION INTRAMUSCULAR
Status: DISCONTINUED | OUTPATIENT
Start: 2017-01-01 | End: 2017-01-01

## 2017-01-01 RX ORDER — POTASSIUM CHLORIDE 20 MEQ/1
40 TABLET, EXTENDED RELEASE ORAL EVERY 4 HOURS
Status: DISPENSED | OUTPATIENT
Start: 2017-01-01 | End: 2017-01-01

## 2017-01-01 RX ORDER — SODIUM CHLORIDE 9 MG/ML
INJECTION, SOLUTION INTRAVENOUS
Status: DISPENSED
Start: 2017-01-01 | End: 2017-01-01

## 2017-01-01 RX ORDER — ONDANSETRON 4 MG/1
4 TABLET, ORALLY DISINTEGRATING ORAL EVERY 6 HOURS PRN
Status: DISCONTINUED | OUTPATIENT
Start: 2017-01-01 | End: 2017-01-01

## 2017-01-01 RX ORDER — NALOXONE HYDROCHLORIDE 0.4 MG/ML
80 INJECTION, SOLUTION INTRAMUSCULAR; INTRAVENOUS; SUBCUTANEOUS AS NEEDED
Status: ACTIVE | OUTPATIENT
Start: 2017-01-01 | End: 2017-01-01

## 2017-01-01 RX ORDER — PSEUDOEPHEDRINE HCL 30 MG
100 TABLET ORAL 2 TIMES DAILY
Qty: 60 CAPSULE | Refills: 0 | Status: SHIPPED | OUTPATIENT
Start: 2017-01-01

## 2017-01-01 RX ORDER — POTASSIUM CHLORIDE 750 MG/1
10 TABLET, EXTENDED RELEASE ORAL 2 TIMES DAILY
Qty: 60 TABLET | Refills: 0 | Status: SHIPPED | OUTPATIENT
Start: 2017-01-01 | End: 2017-01-01

## 2017-01-01 RX ORDER — MORPHINE SULFATE IN 0.9 % NACL 1 MG/ML
0.5 PLASTIC BAG, INJECTION (ML) INTRAVENOUS CONTINUOUS PRN
Status: DISCONTINUED | OUTPATIENT
Start: 2017-01-01 | End: 2017-01-01

## 2017-01-01 RX ORDER — ACETAMINOPHEN 650 MG/1
650 SUPPOSITORY RECTAL EVERY 6 HOURS PRN
Status: DISCONTINUED | OUTPATIENT
Start: 2017-01-01 | End: 2017-01-01

## 2017-01-01 RX ORDER — ALBUTEROL SULFATE 2.5 MG/3ML
2.5 SOLUTION RESPIRATORY (INHALATION) EVERY 4 HOURS PRN
Status: DISCONTINUED | OUTPATIENT
Start: 2017-01-01 | End: 2017-01-01

## 2017-01-01 RX ORDER — HEPARIN SODIUM (PORCINE) LOCK FLUSH IV SOLN 100 UNIT/ML 100 UNIT/ML
SOLUTION INTRAVENOUS
Status: COMPLETED
Start: 2017-01-01 | End: 2017-01-01

## 2017-01-01 RX ORDER — ARIPIPRAZOLE 15 MG/1
40 TABLET ORAL ONCE
Status: COMPLETED | OUTPATIENT
Start: 2017-01-01 | End: 2017-01-01

## 2017-01-01 RX ORDER — DIGOXIN 0.25 MG/ML
250 INJECTION INTRAMUSCULAR; INTRAVENOUS EVERY 6 HOURS
Status: COMPLETED | OUTPATIENT
Start: 2017-01-01 | End: 2017-01-01

## 2017-01-01 RX ORDER — MORPHINE SULFATE 100 MG/5ML
5 SOLUTION ORAL EVERY 4 HOURS PRN
Status: DISCONTINUED | OUTPATIENT
Start: 2017-01-01 | End: 2017-01-01

## 2017-01-01 RX ORDER — SODIUM CHLORIDE 0.9 % (FLUSH) 0.9 %
10 SYRINGE (ML) INJECTION AS NEEDED
Status: DISCONTINUED | OUTPATIENT
Start: 2017-01-01 | End: 2017-01-01

## 2017-01-01 RX ORDER — ONDANSETRON 2 MG/ML
4 INJECTION INTRAMUSCULAR; INTRAVENOUS EVERY 4 HOURS PRN
Status: DISCONTINUED | OUTPATIENT
Start: 2017-01-01 | End: 2017-01-01

## 2017-01-01 RX ORDER — DILTIAZEM HYDROCHLORIDE 5 MG/ML
10 INJECTION INTRAVENOUS
Status: DISPENSED | OUTPATIENT
Start: 2017-01-01 | End: 2017-01-01

## 2017-01-01 RX ORDER — CIPROFLOXACIN 500 MG/1
500 TABLET, FILM COATED ORAL EVERY 12 HOURS SCHEDULED
Status: COMPLETED | OUTPATIENT
Start: 2017-01-01 | End: 2017-01-01

## 2017-01-01 RX ORDER — LIDOCAINE HYDROCHLORIDE AND EPINEPHRINE 10; 10 MG/ML; UG/ML
INJECTION, SOLUTION INFILTRATION; PERINEURAL
Status: COMPLETED
Start: 2017-01-01 | End: 2017-01-01

## 2017-01-01 RX ORDER — ONDANSETRON 2 MG/ML
4 INJECTION INTRAMUSCULAR; INTRAVENOUS EVERY 6 HOURS PRN
Status: DISCONTINUED | OUTPATIENT
Start: 2017-01-01 | End: 2017-01-01

## 2017-01-01 RX ORDER — MELATONIN
325 2 TIMES DAILY WITH MEALS
Status: DISCONTINUED | OUTPATIENT
Start: 2017-01-01 | End: 2017-01-01

## 2017-01-01 RX ORDER — DILTIAZEM HYDROCHLORIDE 60 MG/1
60 TABLET, FILM COATED ORAL EVERY 6 HOURS SCHEDULED
Status: DISCONTINUED | OUTPATIENT
Start: 2017-01-01 | End: 2017-01-01

## 2017-01-01 RX ORDER — MIDAZOLAM HYDROCHLORIDE 1 MG/ML
2 INJECTION INTRAMUSCULAR; INTRAVENOUS EVERY 5 MIN PRN
Status: DISCONTINUED | OUTPATIENT
Start: 2017-01-01 | End: 2017-01-01

## 2017-01-01 RX ORDER — BISACODYL 10 MG
10 SUPPOSITORY, RECTAL RECTAL
Status: DISCONTINUED | OUTPATIENT
Start: 2017-01-01 | End: 2017-01-01

## 2017-01-01 RX ORDER — ATROPINE SULFATE 10 MG/ML
2 SOLUTION/ DROPS OPHTHALMIC EVERY 2 HOUR PRN
Status: DISCONTINUED | OUTPATIENT
Start: 2017-01-01 | End: 2017-01-01

## 2017-01-01 RX ORDER — ACETAMINOPHEN 160 MG/5ML
650 SOLUTION ORAL EVERY 6 HOURS SCHEDULED
Status: DISCONTINUED | OUTPATIENT
Start: 2017-01-01 | End: 2017-01-01

## 2017-01-01 RX ORDER — PANTOPRAZOLE SODIUM 40 MG/1
40 TABLET, DELAYED RELEASE ORAL
Status: DISCONTINUED | OUTPATIENT
Start: 2017-01-01 | End: 2017-01-01

## 2017-01-01 RX ORDER — LOPERAMIDE HYDROCHLORIDE 2 MG/1
2 CAPSULE ORAL 4 TIMES DAILY PRN
Status: DISCONTINUED | OUTPATIENT
Start: 2017-01-01 | End: 2017-01-01

## 2017-01-01 RX ORDER — TORSEMIDE 20 MG/1
20 TABLET ORAL
Status: DISCONTINUED | OUTPATIENT
Start: 2017-01-01 | End: 2017-01-01

## 2017-01-01 RX ORDER — SODIUM CHLORIDE, SODIUM LACTATE, POTASSIUM CHLORIDE, CALCIUM CHLORIDE 600; 310; 30; 20 MG/100ML; MG/100ML; MG/100ML; MG/100ML
INJECTION, SOLUTION INTRAVENOUS CONTINUOUS
Status: DISCONTINUED | OUTPATIENT
Start: 2017-01-01 | End: 2017-01-01

## 2017-01-01 RX ORDER — POTASSIUM CHLORIDE 14.9 MG/ML
20 INJECTION INTRAVENOUS ONCE
Status: DISCONTINUED | OUTPATIENT
Start: 2017-01-01 | End: 2017-01-01

## 2017-01-01 RX ORDER — DIGOXIN 125 MCG
125 TABLET ORAL
Status: DISCONTINUED | OUTPATIENT
Start: 2017-01-01 | End: 2017-01-01

## 2017-01-01 RX ORDER — TORSEMIDE 20 MG/1
20 TABLET ORAL
Qty: 60 TABLET | Refills: 0 | Status: SHIPPED | OUTPATIENT
Start: 2017-01-01 | End: 2017-01-01

## 2017-01-01 RX ORDER — METOLAZONE 2.5 MG/1
2.5 TABLET ORAL
Qty: 10 TABLET | Refills: 0 | Status: SHIPPED | OUTPATIENT
Start: 2017-01-01 | End: 2017-01-01

## 2017-01-01 RX ORDER — HEPARIN SODIUM AND DEXTROSE 10000; 5 [USP'U]/100ML; G/100ML
INJECTION INTRAVENOUS CONTINUOUS
Status: DISCONTINUED | OUTPATIENT
Start: 2017-01-01 | End: 2017-01-01

## 2017-01-01 RX ORDER — FUROSEMIDE 10 MG/ML
40 INJECTION INTRAMUSCULAR; INTRAVENOUS ONCE
Status: COMPLETED | OUTPATIENT
Start: 2017-01-01 | End: 2017-01-01

## 2017-01-01 RX ORDER — FUROSEMIDE 40 MG/1
40 TABLET ORAL
Qty: 60 TABLET | Refills: 0 | Status: SHIPPED | OUTPATIENT
Start: 2017-01-01

## 2017-01-01 RX ORDER — LIDOCAINE 50 MG/G
1 PATCH TOPICAL EVERY 24 HOURS
Status: DISCONTINUED | OUTPATIENT
Start: 2017-01-01 | End: 2017-01-01

## 2017-01-01 RX ORDER — DIGOXIN 125 MCG
125 TABLET ORAL DAILY
Qty: 30 TABLET | Refills: 0 | Status: SHIPPED | OUTPATIENT
Start: 2017-01-01 | End: 2017-01-01

## 2017-01-01 RX ORDER — DIGOXIN 125 MCG
125 TABLET ORAL DAILY
Status: DISCONTINUED | OUTPATIENT
Start: 2017-01-01 | End: 2017-01-01

## 2017-01-01 RX ADMIN — MIDAZOLAM HYDROCHLORIDE 1 MG: 1 INJECTION INTRAMUSCULAR; INTRAVENOUS at 12:55:00

## 2017-01-01 RX ADMIN — MIDAZOLAM HYDROCHLORIDE 1 MG: 1 INJECTION INTRAMUSCULAR; INTRAVENOUS at 12:50:00

## 2017-01-01 RX ADMIN — SODIUM CHLORIDE: 9 INJECTION, SOLUTION INTRAVENOUS at 13:29:00

## 2017-01-01 RX ADMIN — SODIUM CHLORIDE: 9 INJECTION, SOLUTION INTRAVENOUS at 12:53:00

## 2017-02-08 PROBLEM — R06.02 SOB (SHORTNESS OF BREATH) ON EXERTION: Status: ACTIVE | Noted: 2017-01-01

## 2017-02-08 PROBLEM — I50.9 ACUTE CHF (CONGESTIVE HEART FAILURE) (HCC): Status: ACTIVE | Noted: 2017-01-01

## 2017-02-08 PROBLEM — D64.9 ANEMIA, UNSPECIFIED TYPE: Status: ACTIVE | Noted: 2017-01-01

## 2017-02-08 PROBLEM — K92.2 GI BLEED: Status: ACTIVE | Noted: 2017-01-01

## 2017-02-08 PROBLEM — I48.91 ATRIAL FIBRILLATION WITH RAPID VENTRICULAR RESPONSE (HCC): Status: ACTIVE | Noted: 2017-01-01

## 2017-02-08 PROBLEM — R73.9 HYPERGLYCEMIA: Status: ACTIVE | Noted: 2017-01-01

## 2017-02-08 PROBLEM — R60.9 EDEMA: Status: ACTIVE | Noted: 2017-01-01

## 2017-02-08 PROBLEM — I50.9 ACUTE ON CHRONIC CONGESTIVE HEART FAILURE, UNSPECIFIED CONGESTIVE HEART FAILURE TYPE: Status: ACTIVE | Noted: 2017-01-01

## 2017-02-08 PROBLEM — E11.9 DM (DIABETES MELLITUS) (HCC): Status: ACTIVE | Noted: 2017-01-01

## 2017-02-08 PROBLEM — R79.89 AZOTEMIA: Status: ACTIVE | Noted: 2017-01-01

## 2017-02-08 PROBLEM — E87.1 HYPONATREMIA: Status: ACTIVE | Noted: 2017-01-01

## 2017-02-08 NOTE — CONSULTS
[facility] HOSP - [facility]    Report of Consultation    Agnes Olson Patient Status:  Emergency    3/23/1940 MRN [id number]   Location [address] Attending Ariadna Gann MD   Hosp Day # 0 PCP Nalini Gutierrez MD elevated BNP. Patient denies chest pain fever chills or coughing. Patient has no prior cardiac history. EKG shows low voltage with A. fib prior inferior and anterior infarcts with Q waves. She has noted a decreased appetite lately.   She does not feel h the 24 hours ending 02/08/17 9719   This shift:         Scheduled Meds:       Physical Exam:           General:  Alert and oriented x 3.   Short of breath with minimal activity   HEENT:  Normocephalic, anicteric sclera, neck supple, no thyromegaly or adenop

## 2017-02-08 NOTE — ED NOTES
Attempted to call report, RN states she can't take patient on a cardizem drip. TINO Majano states TINO Lawrence in ER is aware of issue, trying to find new nurse.

## 2017-02-08 NOTE — ED PROVIDER NOTES
Patient Seen in: [facility] Emergency Department    History   Patient presents with:  Dyspnea MEREDITH SOB (respiratory)    Stated Complaint: sob    HPI    The patient is a 65-year-old female who presents to the emergency department with 2-3 days of in Temp src 02/08/17 1347 Oral   SpO2 02/08/17 1347 100 %   O2 Device 02/08/17 1347 Nasal cannula       Current:/79 mmHg  Pulse 147  Temp(Src) 99.7 °F (37.6 °C) (Oral)  Resp 28  Ht 167.6 cm (5' 6\")  Wt 83.915 kg  BMI 29.87 kg/m2  SpO2 96%        Phys the following:     HGB 7.7 (*)     HCT 26.5 (*)     MCV 60.8 (*)     MCH 17.5 (*)     MCHC 28.9 (*)     RDW 23.9 (*)     Lymphocyte Absolute 0.3 (*)     ANISOCYTOSIS 2+ (*)     Hypochromia 2+ (*)     Microcytosis 2+ (*)     All other components within norm with right basilar pleural effusion.            Radiology exams  Viewed and reviewed by myself and findings discussed with patient including need for follow up    I spent a total of 30 minutes of critical care time in obtaining history, performing a physica

## 2017-02-08 NOTE — CONSULTS
[facility] HOSP - [facility]    Report of Consultation    [facility] Patient Status:  Emergency    3/23/1940 MRN [id number]   Location [address] Attending Moi Nicholas MD   Hosp Day # 0 PCP Ronan Loo MD mg, Intravenous, Q1H PRN  •  diltiazem 100mg/100ml in NaCl (CARDIZEM) IVPB add-vantage, 10 mg/hr, Intravenous, Continuous    Review of Systems: 10 point ROS discussed with patient.  Positives include that which is stated in HPI  Physical Exam:    /66 basilar pleural effusion. Assessment/Plan:    1. Profound microcytic anemia consistent with iron deficiency anemia. In light of OB + stool, occult GI source of blood loss needs to be considered and ruled out.   2. Acute onset AF with rapid V r

## 2017-02-09 NOTE — H&P
[facility] HOSP - [facility]    History and Physical    Flora Young Patient Status:  Inpatient    3/23/1940 MRN [id number]   Location [facility] 3W/SW Attending Maria D Gold MD   Hosp Day # 0 PCP Martha Barr MD     Date:  2017  Emily Rowe Smokeless Status: Never Used                        Alcohol Use: Yes                Comment: 1 glass, wine, occasionally    Allergies/Medications:    Allergies: No Known Allergies    Prescriptions prior to admission:  PROAIR  (90 BASE) MCG/ACT Neurological: She is oriented to person, place, and time. Skin: Skin is warm.      Cervical Papanicolao to     Results:     Lab Results  Component Value Date   WBC 5.4 02/08/2017   HGB 7.7* 02/08/2017   HCT 26.5* 02/08/2017    02/08/2017   CREATS but will also have to r/o a lesion/mass                      PHYSICIAN Certification of Need for Inpatient Hospitalization - Initial Certification      Patient will require inpatient services that will reasonably be expected to span two midnight's based on

## 2017-02-09 NOTE — PROGRESS NOTES
[facility]    GI Progress Note      Vira Barcenas Patient Status:  Inpatient    3/23/1940 MRN [id number]   Location [facility] 3W/SW Attending Lui Sebastian MD   Hosp Day # 1 PCP Hayden Alves MD          SUBJECTIVE:     Carina Hendricks Weight loss and bilateral LE edema- r/o occult pelvic pathology      PLAN:    1. Panendoscopy when more stable and cleared by cardiology to proceed. 2. Check pelvic US  3. Recheck CxR in AM  4. PRBCs as ordered.        Reynaldo Christy MD  2/9/2017  12:16 PM

## 2017-02-09 NOTE — PROGRESS NOTES
[facility]    Progress Note    Buck Menezes Patient Status:  Inpatient    3/23/1940 MRN [id number]   Location [facility] 3W/SW Attending Jazzmine Turner MD   Hosp Day # 1 PCP Chrissy Jung MD         Assessment and Plan: 02/09/2017    02/09/2017   K 3.9 02/09/2017    02/09/2017   CO2 27 02/09/2017   GLU 90 02/09/2017   CA 7.6* 02/09/2017   ALB 1.9* 02/09/2017   ALKPHO 55 02/09/2017   BILT 0.9 02/09/2017   TP 6.2 02/09/2017   AST 22 02/09/2017   ALT 13* 02/09/20

## 2017-02-09 NOTE — PROGRESS NOTES
[facility] - [facility]    Progress Note    Ash Cole Patient Status:  Inpatient    3/23/1940 MRN [id number]   Location [facility] 3W/SW Attending Cristino Pennington MD   Hosp Day # 1 PCP William Gilbert MD     Subjective:     Constitut better,    Abn CXR- will rt side consolidation/effusion, will monitor, could be due to [facility] but will also have to r/o a lesion/mass  Will monitor       Results:     Lab Results  Component Value Date   WBC 4.2 02/09/2017   HGB 6.6* 02/09/2017   HCT 22.7* 02/

## 2017-02-10 NOTE — PROGRESS NOTES
[facility]    Progress Note    Jory Sanchez Patient Status:  Inpatient    3/23/1940 MRN [id number]   Location [facility] 3W/SW Attending Edwige Brannon MD   Hosp Day # 2 PCP Elena Carreno MD     Subjective:     Constitut distension. There is no rigidity, no rebound, no guarding and no CVA tenderness. Neurological: She is alert and oriented to person, place, and time. Skin: Skin is warm and dry. She is not diaphoretic. No erythema. No pallor.    Psychiatric: She has a no TSH 4.69 02/09/2017   MG 1.6* 02/10/2017   TROP 0.01 02/08/2017       Xr Chest Pa + Lat Chest (cpt=71020)    2/10/2017  CONCLUSION:  1. Moderate cardiomegaly. Pulmonary venous distention has improved.  2. Extensive right basilar lung consolidation and/or

## 2017-02-10 NOTE — PROGRESS NOTES
[place] [facility] - [facility]    Progress Note    Elisa Ava Patient Status:  Inpatient    3/23/1940 MRN [id number]   Location [facility] 3W/SW Attending Demetrio Mcfadden MD   Hosp Day # 2 PCP Abdulaziz Nickerson MD         Assessment and Plan: • furosemide  40 mg Intravenous TID   • Fluticasone  1 puff Inhalation BID             Results:     Lab Results  Component Value Date   WBC 4.2 02/09/2017   HGB 10.2* 02/09/2017   HCT 33.4* 02/09/2017    02/09/2017   CREATSERUM 0.73 02/10/2017   B

## 2017-02-10 NOTE — PROGRESS NOTES
[facility]    GI Progress Note      Adeline Grimm Patient Status:  Inpatient    3/23/1940 MRN [id number]   Location [facility] 3W/SW Attending Jared Malloy MD   Hosp Day # 2 PCP Alan Hidalgo MD          SUBJECTIVE:     Venita Alanis with rapid V response  3. CHF secondary to the above #1 and #2  4.  RLL opacification with pleural effusion           PLAN:    Rx as per cardiology  Panendoscopy when patient agrees      Mayra Knowles MD  2/10/2017  2:00 PM

## 2017-02-10 NOTE — CM/SW NOTE
Met with patient at bedside to enroll in [address] [place] [address] program under DRG (309). Brochure was provided and program was explained. Patient is agreeable to the program's 3 month weekly follow up phone calls.  [facility] brochure was provided,

## 2017-02-11 NOTE — PLAN OF CARE
Had ct abd. Covering k+, cont. o2 & iv lasix, cont. To monitor.      CARDIOVASCULAR - ADULT    • Maintains optimal cardiac output and hemodynamic stability Progressing    • Absence of cardiac arrhythmias or at baseline Progressing        Diabetes/Glucose Co

## 2017-02-11 NOTE — PROGRESS NOTES
[facility]    Progress Note    Mars Grimm Patient Status:  Inpatient    3/23/1940 MRN [id number]   Location [facility] 3W/SW Attending Aislinn Soria MD   Hosp Day # 3 PCP Taye Cuevas MD     Subjective:     Constitut wheezes. She has no rhonchi. She has rales in the right lower field and the left lower field. She exhibits no tenderness. Abdominal: Soft. Bowel sounds are normal. She exhibits mass. She exhibits no distension. There is no tenderness.  There is no rigidit 02/09/2017   TP 6.2 02/09/2017   AST 22 02/09/2017   ALT 13* 02/09/2017   TSH 4.69 02/09/2017   MG 1.5* 02/11/2017   TROP 0.01 02/08/2017       Xr Chest Pa + Lat Chest (cpt=71020)    2/10/2017  CONCLUSION:  1. Moderate cardiomegaly.  Pulmonary venous disten

## 2017-02-11 NOTE — PROGRESS NOTES
[facility]    Cardiology - [facility]  Progress Note    Ash Cole Patient Status:  Inpatient    3/23/1940 MRN [id number]   Location [facility] 3W/SW Attending Cristino Pennington MD   Hosp Day # 3 PCP William Gilbert MD       Ass  02/10/2017   CREATSERUM 0.55 02/11/2017   BUN 15 02/11/2017    02/11/2017   K 3.2* 02/11/2017    02/11/2017   CO2 27 02/11/2017   * 02/11/2017   CA 7.5* 02/11/2017   ALB 1.9* 02/09/2017   ALKPHO 55 02/09/2017   BILT 0.9 02/09/2

## 2017-02-11 NOTE — PROGRESS NOTES
[facility]    Progress Note    Franklin Chase Patient Status:  Inpatient    3/23/1940 MRN [id number]   Location [facility] 3W/SW Attending Sushant Simon MD   Hosp Day # 3 PCP Aaron Jones MD       Subjective:   Antonina Taylor right basilar lung consolidation and/or atelectatic changes with increasing subglottic effusion. Interval worsening. Mild prominence left basilar lung markings         Us Pelvis (transabdominal Pelvis)  (cpt=76856)    2/10/2017  CONCLUSION:  1.  Moderate as

## 2017-02-12 NOTE — CONSULTS
[facility] AND [facility]  Report of Consultation    Eduardo Batres Patient Status:  Inpatient    3/23/1940 MRN [id number]   Location [facility] 3W/SW Attending Lenin Capone MD   Hosp Day # 4 PCP Alessandro Toney MD     Reason for Consultation:    L lymphadenopathy. Musculoskeletal: Negative for myalgias, arthralgias, muscle weakness. Neurological: Negative for headaches, dizziness, speech problems, gait problems and weakness. A comprehensive 14 point review of systems was completed.   Pertinent p distress. Vital Signs: /71 mmHg  Pulse 102  Temp(Src) 98.4 °F (36.9 °C) (Oral)  Resp 20  Ht 1.676 m (5' 6\")  Wt 80.151 kg (176 lb 11.2 oz)  BMI 28.53 kg/m2  SpO2 99%  HEENT: EOMs intact. PERRL. Oropharynx is clear. Neck: No JVD.  No palpable lymph extensive bulky thoracic and abdominopelvic lymphadenopathy with moderate ascites. These findings are highly suspicious for malignancy, favor lymphoma versus metastatic.   2. There is eccentric wall thickening of the sigmoid colon, suspicious for malignancy

## 2017-02-12 NOTE — PROGRESS NOTES
Pt seen and evaluated. Reviewed CT imaging and discussed findings with pt. Explained that biopsy will be required to evaluate for malignancy. Pt verbalized understanding but is naturally anxious. Emotional support was provided.  Will d/w IR about LN biopsy

## 2017-02-12 NOTE — PROGRESS NOTES
[facility]    Cardiology - [facility]  Progress Note     Patient Status:  Inpatient    3/23/1940 MRN [id number]   Location [facility] 3W/SW Attending Wen Martin MD   Hosp Day # 4 PCP Tito Manuel MD       Ass cooperative, calm    Results:     Lab Results  Component Value Date   WBC 4.3 02/12/2017   HGB 9.9* 02/12/2017   HCT 32.5* 02/12/2017    02/12/2017   CREATSERUM 0.55 02/11/2017   BUN 15 02/11/2017    02/11/2017   K 4.2 02/12/2017    02/1

## 2017-02-12 NOTE — PROGRESS NOTES
[place] [facility] - [facility]    Progress Note    Venda Ask Patient Status:  Inpatient    3/23/1940 MRN [id number]   Location [facility] 3W/SW Attending Trish Dyson MD   Hosp Day # 4 PCP Liliana Garcia MD     Subjective:     Constitut Abdominal: Soft. Bowel sounds are normal. She exhibits mass. She exhibits no distension. There is no tenderness. There is no rigidity, no rebound, no guarding and no CVA tenderness. Neurological: She is alert and oriented to person, place, and time. ALB 1.9* 02/09/2017   ALKPHO 55 02/09/2017   BILT 0.9 02/09/2017   TP 6.2 02/09/2017   AST 22 02/09/2017   ALT 13* 02/09/2017   TSH 4.69 02/09/2017   MG 1.7* 02/12/2017   TROP 0.01 02/08/2017       Ct Chest+abdomen+pelvis(all Cntrst Only)(cpt=71260/15298)

## 2017-02-12 NOTE — PLAN OF CARE
Up in chair much of day, joey. Well, enc. To elevate legs, states not diabetic-will report to md on rounds.      CARDIOVASCULAR - ADULT    • Maintains optimal cardiac output and hemodynamic stability Progressing    • Absence of cardiac arrhythmias or at base

## 2017-02-12 NOTE — PROGRESS NOTES
[facility]    Progress Note    Opal Webb Patient Status:  Inpatient    3/23/1940 MRN [id number]   Location [facility] 3W/SW Attending Joshua Lopez MD   Hosp Day # 4 PCP Aldine Runner, MD       Subjective:   Elias Curtis malignancy, favor lymphoma versus metastatic. 2. There is eccentric wall thickening of the sigmoid colon, suspicious for malignancy. An infectious or inflammatory process could also have this appearance. Advise correlation with sigmoidoscopy/colonoscopy.

## 2017-02-13 NOTE — BRIEF PROCEDURE NOTE
[facility]  Brief IR Post-Procedure Note    Betty Majors Patient Status:  Inpatient    3/23/1940 MRN [id number]   Location [facility] 3W/SW Attending Kimmie Minor MD   Hosp Day # 5 PCP Eugene Giordano MD     Procedure: per

## 2017-02-13 NOTE — PROGRESS NOTES
[facility]    Progress Note    Fabien Jackson Patient Status:  Inpatient    3/23/1940 MRN [id number]   Location [facility] 3W/SW Attending Misty Brewster MD   Hosp Day # 5 PCP Eli Coates MD         Assessment and Plan: CC   • Fluticasone  1 puff Inhalation BID             Results:     Lab Results  Component Value Date   WBC 4.3 02/12/2017   HGB 9.9* 02/12/2017   HCT 32.5* 02/12/2017    02/12/2017   CREATSERUM 0.55 02/11/2017   BUN 15 02/11/2017    02/11/2017

## 2017-02-13 NOTE — PROGRESS NOTES
Cytology notified. Site verified and prep with chloraprep and 1 % lido. V.O. Dr Jammie Watts give versed 1 mg and fentanyl 50 mcg IVP given @ 66 12 76. Access site with 17 g x 13.8 cm bard mission coaxial guide.

## 2017-02-13 NOTE — PROGRESS NOTES
[facility]  Inpatient Progress Note    Babetta Early Patient Status:  Inpatient    3/23/1940 MRN [id number]   Location [facility] 3W/SW Attending Jeannette Martinez MD   Hosp Day # 5 PCP Ward Denson MD     Chief Complaint:    Patient dry.  Neurological: Grossly intact.      Labs:       Lab Results  Component Value Date   WBC 4.2 02/13/2017   HGB 9.5 02/13/2017   HCT 31.5 02/13/2017    02/13/2017   CREATSERUM 0.62 02/13/2017   BUN 17 02/13/2017    02/13/2017   K 4.2 02/13/20

## 2017-02-13 NOTE — PROGRESS NOTES
[facility] - [facility]    Progress Note    Eduarod Batres Patient Status:  Inpatient    3/23/1940 MRN [id number]   Location [facility] 3W/SW Attending Lenin Capone MD   Hosp Day # 5 PCP Alessandro Toney MD     Subjective:     Constitut She has no rales. She exhibits no tenderness. Abdominal: Soft. Bowel sounds are normal. She exhibits mass. She exhibits no distension. There is no tenderness. There is no rigidity, no rebound, no guarding and no CVA tenderness.    Neurological: She is maxx BILT 0.9 02/09/2017   TP 6.2 02/09/2017   AST 22 02/09/2017   ALT 13* 02/09/2017   TSH 4.69 02/09/2017   MG 1.7* 02/13/2017   TROP 0.01 02/08/2017       Ct Chest+abdomen+pelvis(all Cntrst Only)(cpt=71260/17872)    2/11/2017  CONCLUSION:   1.  There is ext

## 2017-02-13 NOTE — PLAN OF CARE
Problem: Patient/Family Goals  Goal: Patient/Family Long Term Goal  Patient’s Long Term Goal: learn to better manage condition and prevent hospitalization.     Interventions:  Lifestyle modification, health education  - See additional Care Plan goals for sp

## 2017-02-14 NOTE — SPIRITUAL CARE NOTE
Chp visited to see how pt was doing with her length of stay (7 days) and recent diagnosis. The recent news was not good. Chp provided pastoral support through empathetic listening and prayer.  Pt expressed her uncertainty about next steps and how quickly th

## 2017-02-14 NOTE — PROGRESS NOTES
[facility]    Progress Note    Jory Sanchez Patient Status:  Inpatient    3/23/1940 MRN [id number]   Location [facility] 3W/SW Attending Edwige Brannon MD   Hosp Day # 6 PCP Elena Carreno MD     Subjective:     Constitut She exhibits ascites and mass. She exhibits no distension. There is no tenderness. There is no rigidity, no rebound, no guarding and no CVA tenderness. Neurological: She is alert and oriented to person, place, and time. Skin: Skin is warm and dry.  No r CA 7.8* 02/14/2017   ALB 1.9* 02/09/2017   ALKPHO 55 02/09/2017   BILT 0.9 02/09/2017   TP 6.2 02/09/2017   AST 22 02/09/2017   ALT 13* 02/09/2017   TSH 4.69 02/09/2017   MG 1.6* 02/14/2017   TROP 0.01 02/08/2017                         Hanna Brandt,

## 2017-02-14 NOTE — PROGRESS NOTES
GI  PROGRESS NOTE    SUBJECTIVE: in chair. OBJECTIVE:  Temp:  [97.3 °F (36.3 °C)-98.9 °F (37.2 °C)] 97.4 °F (36.3 °C)  Pulse:  [] 114  Resp:  [16-18] 18  BP: ()/(52-70) 133/70 mmHg  Exam  Gen: No acute distress,   abd--benign.      Labs:

## 2017-02-14 NOTE — PROGRESS NOTES
[facility]  Inpatient Progress Note    Elisa Ava Patient Status:  Inpatient    3/23/1940 MRN [id number]   Location [facility] 3W/SW Attending Demetrio Mcfadden MD   Hosp Day # 6 PCP Abdulaziz Nickerson MD     Chief Complaint:    Patient 02/14/2017    02/14/2017   K 3.1 02/14/2017   CL 94 02/14/2017   CO2 28 02/14/2017    02/14/2017   CA 7.8 02/14/2017   MG 1.6 02/14/2017              Pathology:      Impression/Plan:        1. Lymphadenopathy, splenomegaly. , thickening of  Sig

## 2017-02-14 NOTE — PROGRESS NOTES
[facility]    Progress Note    Fabien Jackson Patient Status:  Inpatient    3/23/1940 MRN [id number]   Location [facility] 3W/SW Attending Misty Brewster MD   Hosp Day # 6 PCP Eli Coates MD         Assessment and Plan: Fluticasone  1 puff Inhalation BID             Results:     Lab Results  Component Value Date   WBC 4.2 02/13/2017   HGB 9.5* 02/13/2017   HCT 31.5* 02/13/2017    02/13/2017   CREATSERUM 0.81 02/14/2017   BUN 20 02/14/2017   * 02/14/2017   K 3

## 2017-02-15 NOTE — PROGRESS NOTES
GI  PROGRESS NOTE    SUBJECTIVE: no new complaints; no abd pain.        OBJECTIVE:  Temp:  [97.4 °F (36.3 °C)-98.6 °F (37 °C)] 98.6 °F (37 °C)  Pulse:  [] 98  Resp:  [18-22] 18  BP: (112-133)/(48-70) 112/56 mmHg  Exam  Gen: No acute distress, alert

## 2017-02-15 NOTE — PROGRESS NOTES
[facility]  Inpatient Progress Note    Gilford Mail Patient Status:  Inpatient    3/23/1940 MRN [id number]   Location [facility] 3W/SW Attending Abhilash Montes MD   Hosp Day # 7 PCP Mazin Katz MD     Chief Complaint:    Patient 02/15/2017   K 3.9 02/15/2017   CL 96 02/15/2017   CO2 30 02/15/2017    02/15/2017   CA 7.7 02/15/2017   ESRML 110 02/14/2017   MG 1.7 02/15/2017   B12 >1500 02/14/2017              Radiology:      Impression/Plan:    1.  Lymphadenopathy, splenomegal

## 2017-02-15 NOTE — PROGRESS NOTES
[place] [facility] - [facility]    Progress Note    Venda Ask Patient Status:  Inpatient    3/23/1940 MRN [id number]   Location [facility] 3W/SW Attending Trish Dyson MD   Hosp Day # 7 PCP Liliana Garcia MD     Subjective:     Constitut deficiency. Profound microcytic anemia. OB+.    Stable Hgb. For colonoscopy, GI note seen, pt agrees that she wants to go ahed with colonoscopy      Acute CHF (congestive heart failure) (HCC) diastolic   Noted echo.  Weight stable.  But still with edema, ca

## 2017-02-15 NOTE — PLAN OF CARE
CARDIOVASCULAR - ADULT    • Maintains optimal cardiac output and hemodynamic stability Progressing    • Absence of cardiac arrhythmias or at baseline Progressing          Diabetes/Glucose Control    • Glucose maintained within prescribed range Progressing

## 2017-02-15 NOTE — PROGRESS NOTES
[facility]    Progress Note    Kailey Fleeting Patient Status:  Inpatient    3/23/1940 MRN [id number]   Location [facility] 3W/SW Attending Barb Mckeon MD   Hosp Day # 7 PCP Suanne Lombard, MD         Assessment and Plan: Inhalation BID             Results:     Lab Results  Component Value Date   WBC 4.2 02/13/2017   HGB 9.5* 02/13/2017   HCT 31.5* 02/13/2017    02/13/2017   CREATSERUM 0.81 02/15/2017   BUN 19 02/15/2017   * 02/15/2017   K 3.9 02/15/2017   CL 9

## 2017-02-15 NOTE — PLAN OF CARE
Maintains optimal cardiac output and hemodynamic stability Progressing      Absence of cardiac arrhythmias or at baseline Progressing      Glucose maintained within prescribed range Progressing      Glucose maintained within prescribed range Progressing

## 2017-02-16 NOTE — PRE-SEDATION ASSESSMENT
Physician Pre-Sedation Assessment    Pre-Sedation Assessment:    Sedation History: No Previous Sedaton Recieved and Airway Assessed    Cardiac: normal S1, S2  Respiratory: breath sounds clear bilaterally   Abdomen: soft, BS (+), non-tender    ASA Classific

## 2017-02-16 NOTE — H&P (VIEW-ONLY)
[facility]OSP - [facility]    Report of Consultation    Mahsa Delvis Patient Status:  Emergency    3/23/1940 MRN [id number]   Location [address] Attending Stephon Casper MD   Hosp Day # 0 PCP Pascual Fernández MD mg, Intravenous, Q1H PRN  •  diltiazem 100mg/100ml in NaCl (CARDIZEM) IVPB add-vantage, 10 mg/hr, Intravenous, Continuous    Review of Systems: 10 point ROS discussed with patient.  Positives include that which is stated in HPI  Physical Exam:    /66 basilar pleural effusion. Assessment/Plan:    1. Profound microcytic anemia consistent with iron deficiency anemia. In light of OB + stool, occult GI source of blood loss needs to be considered and ruled out.   2. Acute onset AF with rapid V r

## 2017-02-16 NOTE — PROGRESS NOTES
[facility]    Progress Note    Roxboro Majors Patient Status:  Inpatient    3/23/1940 MRN [id number]   Location [facility] 3W/SW Attending Kimmie Minor MD   Hosp Day # 8 PCP Eugene Giordano MD     Subjective:     Constitut She has no rhonchi. She has no rales. She exhibits no tenderness. Abdominal: Soft. Bowel sounds are normal. She exhibits no distension. There is no tenderness. There is no rigidity, no rebound, no guarding and no CVA tenderness.    Neurological: She is al 02/16/2017   K 3.2* 02/16/2017   CL 95 02/16/2017   CO2 31 02/16/2017   GLU 94 02/16/2017   CA 8.0* 02/16/2017   ALB 1.9* 02/09/2017   ALKPHO 55 02/09/2017   BILT 0.9 02/09/2017   TP 6.2 02/09/2017   AST 22 02/09/2017   ALT 13* 02/09/2017   TSH 4.69 02/09/

## 2017-02-16 NOTE — ANESTHESIA POSTPROCEDURE EVALUATION
Patient: Ash Douglas    Procedure Summary     Date Anesthesia Start Anesthesia Stop Room / Location    02/16/17 1024 1687 300 [facility] ENDOSCOPY 05 / 300 [facility] ENDOSCOPY       Procedure Diagnosis Surgeon Responsible Provider    COLONOSCOPY (N/A ) Abnormal CT of the abd

## 2017-02-16 NOTE — PROGRESS NOTES
[facility] Cardiology  Progress Note    Sierra [place] Mail Patient Status:  Inpatient    3/23/1940 MRN [id number]   Location [facility] 3W/SW Attending Abhilash Montes MD   Hosp Day # 8 PCP MD Jaden Tejada 02/16/2017   K 3.2* 02/16/2017   CL 95 02/16/2017   CO2 31 02/16/2017   GLU 94 02/16/2017   CA 8.0* 02/16/2017   ALB 1.9* 02/09/2017   ALKPHO 55 02/09/2017   BILT 0.9 02/09/2017   TP 6.2 02/09/2017   AST 22 02/09/2017   ALT 13* 02/09/2017   TSH 4.69 02/09/

## 2017-02-16 NOTE — OPERATIVE REPORT
ENDOSCOPY OPERATIVE REPORT    Patient Name: Geoff Holley  Medical Record #: [id number]  YOB: 1940  Date of Procedure: 2/16/2017    Preoperative Diagnosis: abnormal CT-abdomen suggesting sigmoid pathology.  New diagnosis of Hodgkin's lymphom There was a large bulky friable and vascular nearly obstructing mass at recto-sigmoid junction from ~ 20-26 cm--biopsies obtained. It was unclear if this lesion was primary colonic of from extrinsic invasion.  The scope could be gently maneuvered through th

## 2017-02-16 NOTE — OR NURSING
Updated floor nurse on patients low BP. Dr. French Mena notified, no orders received. Patient sleeping but arouses when calling name. BP has increased.

## 2017-02-16 NOTE — ANESTHESIA PREPROCEDURE EVALUATION
Anesthesia PreOp Note    HPI:     Clifton Ervin is a 67year old female who presents for preoperative consultation requested by: Rehan Shepherd MD    Date of Surgery: 2/8/2017 - 2/16/2017    Procedure(s):  COLONOSCOPY  Indication: abnormal ct    Pre-Op Diagn Epic:  potassium chloride 40 mEq in sodium chloride 0.9 % 250 mL IVPB 40 mEq Intravenous Once Lissette Vázquez MD Last Rate: 62.5 mL/hr at 02/16/17 1041 40 mEq at 02/16/17 1041   Followed by         potassium chloride IVPB premix 20 mEq 20 mEq Intravenous O HCl (CARDIZEM) tab 60 mg 60 mg Oral PRN Trudi Lanier MD     Fluticasone (FLOVENT DISKUS) 50 MCG/BLIST inhaler AEPB 1 puff 1 puff Inhalation BID Manuel MORALES MD 1 puff at 02/16/17 1042    Sodium Chloride 0.9 % solution 3 mL 3 mL Intravenous PRN Pina 118/56   Pulse:  86 80 92   Temp:  97.9 °F (36.6 °C)  97.6 °F (36.4 °C)   TempSrc:  Oral  Oral   Resp:  18  18   Height:       Weight: 75.161 kg (165 lb 11.2 oz)      SpO2:  99%  98%        Anesthesia ROS/Med Hx and Physical Exam     Patient summary review

## 2017-02-16 NOTE — INTERVAL H&P NOTE
Pre-op Diagnosis: abnormal ct    The above referenced H&P was reviewed by Edmundo Scott MD on 2/16/2017, the patient was examined and no significant changes have occurred in the patient's condition since the H&P was performed.   I discussed with the patient a

## 2017-02-17 NOTE — PLAN OF CARE
CARDIOVASCULAR - ADULT    • Maintains optimal cardiac output and hemodynamic stability Completed    • Absence of cardiac arrhythmias or at baseline Completed        Diabetes/Glucose Control    • Glucose maintained within prescribed range Completed        M

## 2017-02-17 NOTE — BRIEF PROCEDURE NOTE
[facility]  Procedure Note    Venda Ask Patient Status:  Inpatient    3/23/1940 MRN [id number]   Location [facility] Attending Trish Dyson MD   Hosp Day # 9 PCP Liliana Garcia MD     Procedur

## 2017-02-17 NOTE — PROGRESS NOTES
[facility]    Progress Note    Lanelle Organ Patient Status:  Inpatient    3/23/1940 MRN [id number]   Location [facility] 3W/SW Attending Mortimer Birks, MD   Hosp Day # 9 PCP Marina Villagomez MD         Assessment and Plan: [START ON 2/20/2017] metolazone  2.5 mg Oral Once per day on Mon Wed Fri   • potassium chloride  20 mEq Intravenous Once   • Ciprofloxacin HCl  500 mg Oral 2 times per day   • torsemide  20 mg Oral BID (Diuretic)   • digoxin  125 mcg Oral Daily   • metopro

## 2017-02-17 NOTE — PROGRESS NOTES
[place] [facility]    Progress Note    Clintgerson Smith Patient Status:  Inpatient    3/23/1940 MRN [id number]   Location [facility] 3W/SW Attending Marina Jurado MD   Hosp Day # 11 PCP Igor Rodriguez MD     Subjective:     Constitu intubated. No respiratory distress. She has no decreased breath sounds. She has no wheezes. She has no rhonchi. She has no rales. She exhibits no tenderness. Abdominal: Bowel sounds are normal. She exhibits ascites and mass. She exhibits no distension.  Molly Delvalle Results  Component Value Date   WBC 3.5* 02/19/2017   HGB 9.3* 02/19/2017   HCT 30.5* 02/19/2017    02/19/2017   CREATSERUM 1.15 02/19/2017   BUN 28* 02/19/2017    02/19/2017   K 3.7 02/19/2017   K 3.7 02/19/2017   CL 99 02/19/2017   CO2 31 02

## 2017-02-17 NOTE — PROGRESS NOTES
[facility]  Inpatient Progress Note    Lanelle Organ Patient Status:  Inpatient    3/23/1940 MRN [id number]   Location [facility] 3W/SW Attending Mortimer Birks, MD   Hosp Day # 9 PCP Marina Villagomez MD     Chief Complaint:    Patient 02/17/2017   CL 96 02/17/2017   CO2 31 02/17/2017    02/17/2017   CA 7.9 02/17/2017   MG 1.7 02/17/2017              Radiology:  Ir Port A Cath Insertion Exchnge Check    2/17/2017  CONCLUSION: Successful transjugular chest wall Portacath placement.

## 2017-02-18 NOTE — PROGRESS NOTES
[facility]    Progress Note    [facility] Patient Status:  Inpatient    3/23/1940 MRN [id number]   Location [facility] 3W/SW Attending Cristino Pennington MD   Hosp Day # 10 PCP William Gilbert MD     Subjective:     Constitu Lab Results  Component Value Date   WBC 4.2 02/13/2017   HGB 9.5* 02/13/2017   HCT 31.5* 02/13/2017    02/13/2017   CREATSERUM 1.13 02/17/2017   BUN 27* 02/17/2017    02/17/2017   K 3.2* 02/18/2017   CL 96 02/17/2017   CO2 31 02/17/2017

## 2017-02-18 NOTE — PROGRESS NOTES
GI  PROGRESS NOTE    SUBJECTIVE: in chair. No new complaints.        OBJECTIVE:  Temp:  [97.4 °F (36.3 °C)-98.1 °F (36.7 °C)] 97.4 °F (36.3 °C)  Pulse:  [76-95] 80  Resp:  [16-25] 24  BP: (101-122)/(38-56) 121/56 mmHg  Exam  Gen: No acute distress, alert sigmoid mass: tubulovillous adenoma/HGD/carcinoma in situ. PLAN: agree with surg.; timing per Dr. Mendez Lambert. Please call us as needed.    Jaylyn Arriaga MD  [facility]   _______________________________________________________________

## 2017-02-18 NOTE — PROGRESS NOTES
[facility]  Inpatient Progress Note    Clint Smith Patient Status:  Inpatient    3/23/1940 MRN [id number]   Location [facility] 3W/SW Attending Marina Jurado MD   Hosp Day # 10 PCP Igor Rodriguez MD     Chief Complaint:    Patient sounds. Distended abdomen.    Extremities: 3+  edema. Non-tender. Skin:  Warm, dry. Neurological: Grossly intact.  .   Labs:       Lab Results  Component Value Date   K 3.2 02/18/2017   MG 1.6 02/18/2017              Radiology:  Ir Port A Cath Insertion Ex

## 2017-02-18 NOTE — PROGRESS NOTES
[facility]    Cardiology Progress Note    Ash Cole Patient Status:  Inpatient    3/23/1940 MRN [id number]   Location [facility] 3W/SW Attending Cristino Pennington MD   Hosp Day # 10 PCP William Gilbert MD     Patient Active Sodium Lock Flush  1.5 mL Intravenous Once   • Ciprofloxacin HCl  500 mg Oral 2 times per day   • digoxin  125 mcg Oral Daily   • metoprolol tartrate  25 mg Oral 2x Daily(Beta Blocker)   • Pantoprazole Sodium  40 mg Oral QAM AC   • insulin aspart  1-5 Unit

## 2017-02-19 NOTE — CONSULTS
[facility]    PATIENT'S NAME: Estefani General PHYSICIAN: Ludwin Marie MD   CONSULTING PHYSICIAN: Randall Wallace MD   PATIENT ACCOUNT#:   [id number]    LOCATION:  [address] [place] [address] RECORD #:   [id number]       DATE OF BIRTH: and intensive treatment was started for her atrial fibrillation with rapid ventricular response. Dr. Cortney Ling was on consultation and managed the atrial fibrillation that eventually started improving on beta blocker and digoxin.   The patient also received have reviewed the history, which is very complex given the presence of congestive heart failure due to the patient's atrial fibrillation with rapid ventricular response but at this time apparently has been controlled.   The patient has anasarca with signifi fast ventricular response, and congestive heart failure, mostly right. GASTROINTESTINAL/GENITOURINARY: The patient denies gastroesophageal reflux or need for antacid therapy, although she takes antacid from over-the-counter medication.   She denies severe throughout. She walks with certain difficulty. IMPRESSION:    1. Carcinoma of the colon. 2.   Classical Hodgkin disease. 3.   Atrial fibrillation with congestive heart failure, apparently controlled.   4.   Anasarca with significant pitting edema of

## 2017-02-19 NOTE — PROGRESS NOTES
[facility]    Cardiology Progress Note    Agnes Olson Patient Status:  Inpatient    3/23/1940 MRN [id number]   Location [facility] 3W/SW Attending Neal Thornton MD   Hosp Day # 11 PCP Nalini Gutierrez MD     Patient Active Ciprofloxacin HCl  500 mg Oral 2 times per day   • digoxin  125 mcg Oral Daily   • metoprolol tartrate  25 mg Oral 2x Daily(Beta Blocker)   • Pantoprazole Sodium  40 mg Oral QAM AC   • insulin aspart  1-5 Units Subcutaneous TID CC   • Fluticasone  1 puff I 13:50:19 The previous heart rate was 162 Electronically signed on 02/18/2017 at 19:34 by Ada Fregoso MD  2/19/2017

## 2017-02-19 NOTE — PROGRESS NOTES
[facility]    Progress Note    Flora Young Patient Status:  Inpatient    3/23/1940 MRN [id number]   Location [facility] 3W/SW Attending Maria D Gold MD   Hosp Day # 11 PCP Martha Barr MD       Subjective:   Denies com 260 820 340    I.V. (mL/kg) 0 (0) 4 (0.1)     Total Intake(mL/kg) 260 (3.4) 824 (11.2) 340 (4.6)    Urine (mL/kg/hr) 1000 (0.5) 1550 (0.9) 760 (1.4)    Stool 0 (0)  0 (0)    Total Output 1000 1550 760    Net -391 -272 -467           Stool Occurrence 0 x  1

## 2017-02-20 PROBLEM — J44.9 COPD (CHRONIC OBSTRUCTIVE PULMONARY DISEASE) (HCC): Chronic | Status: ACTIVE | Noted: 2017-01-01

## 2017-02-20 PROBLEM — N18.9 CRI (CHRONIC RENAL INSUFFICIENCY): Chronic | Status: ACTIVE | Noted: 2017-01-01

## 2017-02-20 PROBLEM — E46 MALNUTRITION (HCC): Status: ACTIVE | Noted: 2017-01-01

## 2017-02-20 PROBLEM — C18.9 COLON CANCER (HCC): Status: ACTIVE | Noted: 2017-01-01

## 2017-02-20 PROBLEM — R41.0 ACUTE CONFUSION: Status: ACTIVE | Noted: 2017-01-01

## 2017-02-20 NOTE — PROGRESS NOTES
Focus: AMS changes  D: Per report, pt demonstrated significant memory loss at 1630 with niece at bedside. Dr Brenna Nelson was notified and CXR, UA and urine C&S were ordered. At 80, pt's son requested assistance to take pt to the bathroom.  Pt seemed to have dif

## 2017-02-20 NOTE — PROGRESS NOTES
[place] [facility]    Progress Note    Ash Cole Patient Status:  Inpatient    3/23/1940 MRN [id number]   Location [facility] 3W/SW Attending Cristino Pennington MD   Hosp Day # 12 PCP William Gilbert MD     Subjective:     Constitu tenderness. Neurological: She is alert and oriented to person, place, and time. She displays no atrophy and no tremor. No cranial nerve deficit or sensory deficit. She exhibits normal muscle tone. Pt. States R handed but R UE feels heavy.  Decreased gri CREATSERUM 1.08 02/20/2017   BUN 29* 02/20/2017    02/20/2017   K 3.6 02/20/2017   K 3.6 02/20/2017   CL 97 02/20/2017   CO2 34* 02/20/2017   * 02/20/2017   CA 7.9* 02/20/2017   ALB 2.1* 02/19/2017   ALKPHO 56 02/19/2017   BILT 0.9 02/19/201

## 2017-02-20 NOTE — PROGRESS NOTES
[facility]    Progress Note    Adeline Grimm Patient Status:  Inpatient    3/23/1940 MRN [id number]   Location [facility] 3W/SW Attending Jared Malloy MD   Hosp Day # 12 PCP Alan Hidalgo MD     Subjective:   Subjective episode- pt unable to recall year or what happened last night   Able to state president and knows she is at [address]  Unable to lift R arm off of bed with out assistance states that it is weak  Possible CVA vs tia  heparin gtt started / was unable to [facility] due to fibrillation Diffuse low voltage.  -Inferior infarct - Old anteroseptal MI Nonspecific ST and T wave abnormality ABNORMAL When compared with ECG of 02/08/2017 13:50:19 The previous heart rate was 162 Electronically signed on 02/18/2017 at 19:34 by Lisette Serna,

## 2017-02-20 NOTE — SIGNIFICANT EVENT
Night MD    Impressions:  ---Disorientation and memory loss, noted over ~ past 4.5 hours. Not oriented to place, day of week, month, year, or circumstances of hospitalization. No gross focal neuro deficits.   CT brain->no bleed or other acute findings  -- basilar pleural effusion/atelectasis. New diagnosis of Hodgkin's lymphoma this admission. To have surgery for nearly obstructing sigmoid area mass, when medically stable. Dx made by retroperitoneal LN bx.   Will need chemotherapy after surgical debulki remember president's name,  though niece told nurse that patient is a Trump supporter, and nurse had just mentioned Trump's name to patient  a few minutes before I arrived.      /51 mmHg  Pulse 90  Temp(Src) 98.4 °F (36.9 °C) (Oral)  Resp 16  Ht 5' 6\ 22-32 mmol/L   BUN 28 (H) 8-20 mg/dL   Creatinine 1.15 0.50-1.50 mg/dL   Calcium, Total 8.1 (L) 8.5-10.5 mg/dL   ALT 17 14-54 U/L   AST 25 15-41 U/L   Alkaline Phosphatase 56  U/L   Bilirubin, Total 0.9 0.3-1.2 mg/dL   Total Protein 6.8 5.9-8.4 g/dL

## 2017-02-20 NOTE — PROGRESS NOTES
Night MD  (See prior \"Significant Event\" note---I was unable to figure out how to do an Addendum to that note---DPD)    Additional record review ->  (1) AF noted first on admission  (2) not anticoagulated previousle 2/2 anemia + OB positive stool on admi

## 2017-02-20 NOTE — PROGRESS NOTES
[facility]    Progress Note    Vira Barcenas Patient Status:  Inpatient    3/23/1940 MRN [id number]   Location [facility] 3W/SW Attending Lui Sebastian MD   Hosp Day # 12 PCP Hayden Alves MD         Assessment and Plan: Fluticasone  1 puff Inhalation BID             Results:     Lab Results  Component Value Date   WBC 3.0* 02/20/2017   HGB 8.4* 02/20/2017   HCT 27.9* 02/20/2017    02/20/2017   CREATSERUM 1.08 02/20/2017   BUN 29* 02/20/2017    02/20/2017   K

## 2017-02-20 NOTE — PROGRESS NOTES
[facility]  Inpatient Progress Note    Mars Grimm Patient Status:  Inpatient    3/23/1940 MRN [id number]   Location [facility] 3W/SW Attending Aislinn Soria MD   Hosp Day # 12 PCP Taye Cuevas MD     Chief Complaint:    Patient Abdomen:Abdominal distension++  Extremities: 3+ edema bilaterally. Skin:  Warm, dry. Neurological: Grossly intact.      Labs:       Lab Results  Component Value Date   WBC 3.0 02/20/2017   HGB 8.4 02/20/2017   HCT 27.9 02/20/2017    02/20/2017 check LDH, ESR.  - Discussed that treatment is primarily systemic chemotherapy ( ABVD). -S/P PFTs. 2D echo was performed and EF 60%. S/P poor placement  -       2. Anemia  -Microcytic. Iron studes indicative of iron defciency. S/P PRBC transfusion.  Hgb st

## 2017-02-20 NOTE — CONSULTS
[facility]    Report of Consultation    Flora Beckajesus Patient Status:  Inpatient    3/23/1940 MRN [id number]   Location [facility] 3W/SW Attending Maria D Gold MD   Hosp Day # 12 PCP Martha Barr MD     Date of Admission not smoke. She is to alcohol in the past but now uses it rarely.         Current Medications:    Current Facility-Administered Medications:  furosemide (LASIX) tab 40 mg 40 mg Oral BID (Diuretic)   heparin (PORCINE) drip 90005clrzx/250mL infusion CONTINUOU weakness  SKIN: denies any unusual skin lesions or rashes  EYES: no visual complaints or deficits  HEENT: denies nasal congestion, sinus pain or sore throat; hearing loss negative  RESPIRATORY: denies shortness of breath, wheezing or cough   CARDIOVASCULAR nose, heel to shin intact bilaterally. Gait: Her gait is wide-based. She is able to stand on her heels and toes.     Results:     Laboratory Data:    Lab Results  Component Value Date   WBC 3.0* 02/20/2017   HGB 8.4* 02/20/2017   HCT 27.9* 02/20/2017

## 2017-02-20 NOTE — PROGRESS NOTES
[place] [facility]    Progress Note    Kailey Fleeting Patient Status:  Inpatient    3/23/1940 MRN [id number]   Location [facility] 3W/ Attending Barb Mckeon MD   Hosp Day # 11 PCP Suanne Lombard, MD     Subjective:     Bettye Pearce cystitis without hematuria  On cipro      Non-Hodgkin's lymphoma ([facility] [address]  No rx for now/ oncology following  ?mental status changes  Will order neuro checks/monitor/ cxr/ repeat ua    Repeat labs in am/full code/ cpm    Patient with acute confusion/ ? Etiolo

## 2017-02-21 NOTE — PROGRESS NOTES
[facility]    Progress Note    Adeline Grimm Patient Status:  Inpatient    3/23/1940 MRN [id number]   Location [facility] 3W/SW Attending Jared Malloy MD   Hosp Day # 15 PCP Alan Hidalgo MD       Subjective:   Katie Beat on heparin, but a decision needs to be made on long-term anticoagulants. Conference is planned with oncology this afternoon to decide if surgical intervention is necessary and what chemotherapy is planned.   Depending on the risks of continuing anticoagula 02/21/2017   CO2 36* 02/21/2017   * 02/21/2017   CA 7.7* 02/21/2017   ALB 2.1* 02/19/2017   ALKPHO 56 02/19/2017   BILT 0.9 02/19/2017   TP 6.8 02/19/2017   AST 25 02/19/2017   ALT 17 02/19/2017   .9* 02/21/2017   TSH 4.69 02/09/2017   ESRML

## 2017-02-21 NOTE — PROGRESS NOTES
[facility]  Inpatient Progress Note    Ashlee Lopez Patient Status:  Inpatient    3/23/1940 MRN [id number]   Location [facility] 3W/SW Attending Dax Alexandra MD   Hosp Day # 15 PCP Samantha Eastman MD     Chief Complaint:    Patient Grossly intact.      Labs:       Lab Results  Component Value Date   WBC 3.2 02/21/2017   HGB 8.6 02/21/2017   HCT 27.7 02/21/2017    02/21/2017   CREATSERUM 0.98 02/21/2017   BUN 28 02/21/2017    02/21/2017   K 3.4 02/21/2017   K 3.4 02/21/201 obstructing friable and vascular mass. Biospy obtained. 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  - Discussed with pt and son. The mass concerning for primary colon carcinoma.  Given the near obstructing

## 2017-02-21 NOTE — PROGRESS NOTES
[place] [facility]    Progress Note    Ash Cole Patient Status:  Inpatient    3/23/1940 MRN [id number]   Location [facility] 3W/SW Attending Cristino Pennington MD   Hosp Day # 13 PCP William Gilbert MD     Subjective:     Constitu mass.   Neurological: She is alert and oriented to person, place, and time.    Pt demonstrates mild weakness/ right sided/ Upper arm and lower extremity / LE > UE   Fine motor  equal Upper/ however pt reports hand writing has changed Right- handed / wor resolved / pt A/O x 3   Likely TIA per neurology  Brain CT no acute findings    MRI no acute findings see report  Carotid us pending    Neuro checks q4  Tsh wnl  Anticoagulated currently/ ? Change to po?   Check a1c/ lipid panel  Therapy following PT/OT   A evidence for acute infarct. Mri Brain ([id number])    2/20/2017  CONCLUSION:  1. No acute intracranial process.  Small focus of T2 shine through artifact in the posterior right frontal lobe near the vertex, likely representing subacute-chronic ischem

## 2017-02-21 NOTE — PROGRESS NOTES
[facility]    Progress Note    Laura Pretalex Patient Status:  Inpatient    3/23/1940 MRN [id number]   Location [facility] 3W/SW Attending Zain Navarro MD   Hosp Day # 13 PCP Melissa Barragan MD       Subjective:   Complains 02/21 0659 02/21 0700 - 02/22 0659    P.O. 1120 470 70    I.V. (mL/kg) 86 (1.2) 157 (2.2)     Total Intake(mL/kg) 1206 (16.6) 627 (8.7) 70 (1)    Urine (mL/kg/hr) 1310 (0.8) 450 (0.3) 800 (1.6)    Stool 0 (0) 0 (0)     Total Output 1310 450 800    Net -104

## 2017-02-21 NOTE — PHYSICAL THERAPY NOTE
PHYSICAL THERAPY EVALUATION - INPATIENT     Room Number: 310/310-A  Evaluation Date: 2/21/2017  Type of Evaluation: Initial  Physician Order: PT Eval and Treat    Presenting Problem: A-fib  Reason for Therapy: Mobility Dysfunction and Discharge Joen Cognitive Status:  WFL - within functional limits  · Orientation Level:  oriented x4    RANGE OF MOTION AND STRENGTH ASSESSMENT    Lower extremity ROM is within functional limits     Lower extremity strength is within functional limits Right Hip flexion  4 training  Posture  ROM  Strengthening  Lower therapeutic exercise: Alternating marching  Ankle pumps  LAQ  Transfer training    Patient End of Session: Up in chair;Needs met;Call light within reach;RN aware of session/findings; All patient questions and co modified independent     Goal #4    Goal #5    Goal #6    Goal Comments: Goals established on 2/21/2017

## 2017-02-21 NOTE — PROGRESS NOTES
[facility]    Progress Note    Buck Menezes Patient Status:  Inpatient    3/23/1940 MRN [id number]   Location [facility] 3W/SW Attending Jazzmine Turner MD   Hosp Day # 15 PCP Chrissy Jung MD         Assessment and Plan: potassium chloride  40 mEq Intravenous Once   • furosemide  40 mg Oral BID (Diuretic)   • ferrous sulfate  325 mg Oral BID with meals   • Heparin Sodium Lock Flush  1.5 mL Intravenous Once   • digoxin  125 mcg Oral Daily   • metoprolol tartrate  25 mg Oral

## 2017-02-21 NOTE — DIETARY NOTE
ADULT NUTRITION INITIAL ASSESSMENT    Pt is at moderate nutrition risk. Pt meets malnutrition criteria.       CRITERIA FOR MALNUTRITION DIAGNOSIS:  Criteria for non-severe malnutrition diagnosis: acute illness/injury related to fluid accumulation mild and pulmonary disease) ([facility] [address]     Acute confusion     CRI (chronic renal insufficiency)     Malnutrition (HCC)     Hemispheric carotid artery syndrome    PERTINENT PAST MEDICAL HISTORY:   Past Medical History   Diagnosis Date   • COPD (chronic obstructive pulmo Magnesium 1.7    NUTRITION RELATED PHYSICAL FINDINGS:  - Body Fat/Muscle Mass: mild depletion body fat triceps region and mild depletion muscle mass clavicle region per visual exam.  - Fluid Accumulation: ascites, lower extremity edema and generalized alexis

## 2017-02-21 NOTE — SLP NOTE
ADULT SWALLOWING EVALUATION    ASSESSMENT & PLAN   ASSESSMENT    Pt seen for swallow evaluation d/t suspected TIA. Pt with PMH of COPD. Julio Shultz Pt assessed sitting upright in chair. Pt presented with solid and thin liquid trials.  Bilabial seal adequate with no Cataract        Prior Living Situation: Home with support (lives with son)  Diet Prior to Admission: Regular; Thin liquids  Precautions: Aspiration      Patient/Family Goals:  \"to be discharged soon\"    SWALLOWING HISTORY  Current Diet Consistency: Regula

## 2017-02-21 NOTE — PROGRESS NOTES
[place] [facility]    Progress Note    Nevada Lamp Patient Status:  Inpatient    3/23/1940 MRN [id number]   Location [facility] 3W/SW Attending Lissette Vázquez MD   Hosp Day # 13 PCP Mamie Kitchen MD     Subjective:     Constitu tenderness. Abdominal: Soft. Bowel sounds are normal. She exhibits ascites and mass. She exhibits no distension. There is no tenderness. There is no rigidity, no rebound, no guarding and no CVA tenderness.    Neurological: She is alert and oriented to per CREATSERUM 0.98 02/21/2017   BUN 28* 02/21/2017    02/21/2017   K 3.4 02/21/2017   K 3.4 02/21/2017   CL 97 02/21/2017   CO2 36* 02/21/2017   * 02/21/2017   CA 7.7* 02/21/2017   ALB 2.1* 02/19/2017   ALKPHO 56 02/19/2017   BILT 0.9 02/19/201

## 2017-02-22 NOTE — PROGRESS NOTES
[place] [facility]    Progress Note    Ash Cole Patient Status:  Inpatient    3/23/1940 MRN [id number]   Location [facility] 3W/SW Attending Cristino Pennington MD   Hosp Day # 14 PCP William Gilbert MD         Assessment and Plan: 100 mg Oral BID   • furosemide  40 mg Oral BID (Diuretic)   • ferrous sulfate  325 mg Oral BID with meals   • Heparin Sodium Lock Flush  1.5 mL Intravenous Once   • digoxin  125 mcg Oral Daily   • metoprolol tartrate  25 mg Oral 2x Daily(Beta Blocker)   •

## 2017-02-22 NOTE — OCCUPATIONAL THERAPY NOTE
OCCUPATIONAL THERAPY EVALUATION - INPATIENT     Room Number: 310/310-A  Evaluation Date: 2/21/2017  Type of Evaluation: Initial  Presenting Problem:  (A-FIB with RVR and TIA)    Physician Order: IP Consult to Occupational Therapy  Reason for Therapy: ADL/I pulmonary disease) ([facility] [address] 4/2013     EMH   • Cataract        Past Surgical History      Past Surgical History    ELECTROCARDIOGRAM, COMPLETE  7/5/2012    Comment scanned to media tab    BIOPSY      Comment retro peritoneal lymphnodes    COLONOSCOPY N/A 2/16 Alternating Movement: Symmetrical  Coordination - Finger Opposition: Symmetrical    ACTIVITIES OF DAILY LIVING ASSESSMENT  AM-PAC ‘6-Clicks’ Inpatient Daily Activity Short Form  How much help from another person does the patient currently need…  -   Regina

## 2017-02-22 NOTE — PLAN OF CARE
METABOLIC/FLUID AND ELECTROLYTES - ADULT    • Electrolytes maintained within normal limits    K AND MG COVERED PER PROTOCOL Progressing        NEUROLOGICAL - ADULT    • NO NEURO CHANGE.   Progressing        PAIN - ADULT    • Verbalizes/displays adequate com

## 2017-02-22 NOTE — PROGRESS NOTES
[facility]    Progress Note    Laura Pretty Patient Status:  Inpatient    3/23/1940 MRN [id number]   Location [facility] 3W/SW Attending Zain Navarro MD   Hosp Day # 14 PCP Melissa Barragan MD     Subjective:   Subjective medical status optimized if pt agrees    Constipation  Does not remember having BM since Cscope  softness ordered  Pt states she is going to try this am  No n/v  Monitor for obs      COPD (chronic obstructive pulmonary disease) (HCC)  Stable on RA  flovent There are moderate microvascular white matter ischemic changes, likely related to long-standing hypertension and/or diabetes. [facility]    2/21/2017  CONCLUSION:  1. Mild atherosclerotic changes bilaterally.  2. N

## 2017-02-22 NOTE — PLAN OF CARE
CARDIOVASCULAR - ADULT    • Maintains optimal cardiac output and hemodynamic stability Progressing    • Absence of cardiac arrhythmias or at baseline Progressing        METABOLIC/FLUID AND ELECTROLYTES - ADULT    • Hemodynamic stability and optimal renal f

## 2017-02-22 NOTE — PROGRESS NOTES
[facility]    Progress Note    Laura Gutierrez Patient Status:  Inpatient    3/23/1940 MRN [id number]   Location [facility] 3W/SW Attending Zain Navarro MD   Hosp Day # 14 PCP Melissa Barragan MD       Subjective:   Izabella Ornelas lobe.    Decision is currently being made on chemotherapy. Because of the risk of GI bleeding, it was elected to stop the heparin, and will start low-dose aspirin. Patient is still in the process of discussing chemotherapy options with oncology.   Abdomin 02/19/2017   PTT 80.7* 02/22/2017   TSH 4.69 02/09/2017   ESRML 86* 02/18/2017   MG 1.7* 02/22/2017   TROP 0.01 02/08/2017   B12 >1500* 02/14/2017       Mri Brain (vym=[id number])    2/20/2017  CONCLUSION:  1. No acute intracranial process.  Small focus of T2 sh

## 2017-02-22 NOTE — PROGRESS NOTES
[place] [facility]    Progress Note    Gilford Mail Patient Status:  Inpatient    3/23/1940 MRN [id number]   Location [facility] 3W/SW Attending Abhilash Montes MD   Hosp Day # 14 PCP Mazin Katz MD     Subjective:     Constitu tenderness. Abdominal: Soft. Bowel sounds are normal. She exhibits ascites. She exhibits no distension. There is no tenderness. There is no rigidity, no rebound, no guarding and no CVA tenderness.    Neurological: She is alert and oriented to person, plac 56 02/19/2017   BILT 0.9 02/19/2017   TP 6.8 02/19/2017   AST 25 02/19/2017   ALT 17 02/19/2017   PTT 80.7* 02/22/2017   TSH 4.69 02/09/2017   ESRML 86* 02/18/2017   MG 1.7* 02/22/2017   TROP 0.01 02/08/2017   B12 >1500* 02/14/2017       Mri Brain (bei=[id number]

## 2017-02-22 NOTE — PROGRESS NOTES
[facility]  Inpatient Progress Note    Karon Fothergill Patient Status:  Inpatient    3/23/1940 MRN [id number]   Location [facility] 3W/SW Attending Geri Adan MD   Hosp Day # 15 PCP Abiodun Morgan MD     Chief Complaint:    Patient 02/22/2017    02/22/2017   K 3.3 02/22/2017   PTT 80.7 02/22/2017   MG 1.7 02/22/2017              Radiology:  Us Carotid Doppler Bilat - [facility] (cpt=93880)    2/21/2017  CONCLUSION:  1. Mild atherosclerotic changes bilaterally.  2. No significant acute infarct. Noted Neuro input. Pt with ongoing acute issues. Case was discussed at length in the GI MDC. Pt considered too high risk for colon surgery at this time.  Will need to plan 1 cycle treatment for Hodgkin lymphoma prior to planning surgery

## 2017-02-23 PROBLEM — R63.0 ANOREXIA: Status: ACTIVE | Noted: 2017-01-01

## 2017-02-23 PROBLEM — G47.00 INSOMNIA: Status: ACTIVE | Noted: 2017-01-01

## 2017-02-23 PROBLEM — Z71.89 GOALS OF CARE, COUNSELING/DISCUSSION: Status: ACTIVE | Noted: 2017-01-01

## 2017-02-23 PROBLEM — Z71.89 ADVANCED CARE PLANNING/COUNSELING DISCUSSION: Status: ACTIVE | Noted: 2017-01-01

## 2017-02-23 NOTE — PROGRESS NOTES
[place] [facility] - [facility]    Progress Note    Gilford Mail Patient Status:  Inpatient    3/23/1940 MRN [id number]   Location [facility] 3W/SW Attending Abhilash Montes MD   Hosp Day # 15 PCP Mazin Katz MD         Assessment and Plan: 02/23/2017   HCT 28.6* 02/23/2017    02/23/2017   CREATSERUM 1.05 02/23/2017   BUN 30* 02/23/2017    02/23/2017   K 3.7 02/23/2017   CL 97 02/23/2017   CO2 33* 02/23/2017   * 02/23/2017   CA 8.0* 02/23/2017   ALB 2.1* 02/19/2017   ALKPH

## 2017-02-23 NOTE — SPIRITUAL CARE NOTE
Chp visited patient this afternoon, following up on visits on Monday and Tuesday. Pt reports she is struggling with decisions on treatment options. Chp provided pastoral support through empathetic listening and prayer.  Pt reported her son Porfirio Parra will be co

## 2017-02-23 NOTE — DISCHARGE PLANNING
2/23/17 CM Discharge planning   Met with pt, family at bedside, pt declined visit stated that she just had a long visit from the oncologist and wanted time to pray and reflect.   CM provided business card with contact information and will follow up in am.

## 2017-02-23 NOTE — PHYSICAL THERAPY NOTE
Attempted treatment pt's family refused therapy for today. Will attempt therapy tomorrow if appropriate.

## 2017-02-23 NOTE — PROGRESS NOTES
[facility]    Progress Note    Elisa Ava Patient Status:  Inpatient    3/23/1940 MRN [id number]   Location [facility] 3W/SW Attending Demetrio Mcfadden MD   Hosp Day # 15 PCP Abdulaziz Nickerson MD     Subjective:   Subjective if pt agrees- pt would need 1x round of chemo before sx     Constipation  Does not remember having BM since Cscope  softness ordered  Passed Liquid mucus- light brown with pink tinge   No n/v  Monitor for obs      COPD (chronic obstructive pulmonary diseas

## 2017-02-23 NOTE — PROGRESS NOTES
[facility]    Progress Note    Gilsum [place] Patient Status:  Inpatient    3/23/1940 MRN [id number]   [facility] 3W/SW Attending Stacey Tamez MD   Hosp Day # 15 PCP Ronan Loo MD       Subjective:   Shaina Mcmullen Facility-Administered Medications:  [START ON 2/27/2017] digoxin (LANOXIN) tab 125 mcg 125 mcg Oral Once per day on Mon Wed Fri   Potassium Chloride ER (K-DUR M20) CR tab 40 mEq 40 mEq Oral Once   PEG 3350 (MIRALAX) powder packet 17 g 17 g Oral Daily   doc

## 2017-02-23 NOTE — PROGRESS NOTES
[facility]    Progress Note    Lanelle Organ Patient Status:  Inpatient    3/23/1940 MRN [id number]   Location [facility] 3W/SW Attending Mortimer Birks, MD   Hosp Day # 15 PCP Marina Villagomez MD     Subjective:     Constitu respiratory distress. She has no decreased breath sounds. She has no wheezes. She has no rhonchi. She has no rales. She exhibits no tenderness. Abdominal: Soft. Bowel sounds are normal. She exhibits ascites and mass. She exhibits no distension.  There is 3.5* 02/23/2017   HGB 8.8* 02/23/2017   HCT 28.6* 02/23/2017    02/23/2017   CREATSERUM 1.05 02/23/2017   BUN 30* 02/23/2017    02/23/2017   K 3.7 02/23/2017   CL 97 02/23/2017   CO2 33* 02/23/2017   * 02/23/2017   CA 8.0* 02/23/2017

## 2017-02-23 NOTE — CONSULTS
Adeline Patient Status:  Inpatient    3/23/1940 MRN [id number]   Location [facility] 3W/SW Attending Lenin Capone MD   Hosp Day # 13 PCP Alessandro Toney MD     Date of Co inpatient chemotherapy by Dr. Beasley Shown. She is considered high risk for colon surgery which may need to be done emergently if the bowel perforates or in the event of complete obstruction.  A family meeting was held 2 days ago and the patient and her son are tells me that he  under inpatient hospice care. She has 1 son, Osmani Corey, whom she lives with in a single family home. She is retired from work at both the [address] [place] [address].  She is of the [place] and was attending [facility] discussed that Sonal Hairston is shown as full code in the computer. I discussed the risks versus benefits of life sustaining treatments in the setting of advanced lymphoma and colon cancer. Sonal Hairston verbalized wish for DNR, DNI, and no feeding tube placement.  Sonal Hairston a mg, Oral, BID  •  aspirin EC tab 81 mg, 81 mg, Oral, Daily  •  acetaminophen (TYLENOL) tab 650 mg, 650 mg, Oral, Q6H PRN  •  furosemide (LASIX) tab 40 mg, 40 mg, Oral, BID (Diuretic)  •  ipratropium-albuterol (DUONEB) nebulizer solution 3 mL, 3 mL, Nebuliz 02/23/2017   BUN 30* 02/23/2017    02/23/2017   K 3.7 02/23/2017   CL 97 02/23/2017   CO2 33* 02/23/2017   * 02/23/2017   CA 8.0* 02/23/2017   ALB 2.1* 02/19/2017   ALKPHO 56 02/19/2017   BILT 0.9 02/19/2017   TP 6.8 02/19/2017   AST 25 02/19/ Referral placed for Spiritual Care    Disposition: ongoing goals of care discussions    Hospitalization:  Limited treatment: transfer to hospital but no endotracheal intubation or long-term life support.     Artificially administered means of nutrition:  Do permission to discuss potential option for comfort care/hospice and she agreed. Thus, I provided education about the Medicare hospice benefit and philosophy. I encouraged Ml oNrton to consider meeting with [facility] for an informational session.  She examination, and >50% was spent counseling and coordinating care. Discussed today's visit with Dr. Juan Honeycutt, Dr. Sammy Hernandez, and RN Flavia Mosqueda. Thank you for allowing the Palliative Care Team to participate in the care of your patient.   We will continue to fo

## 2017-02-24 NOTE — PLAN OF CARE
Received patient to the unit, made comfortable on bed. Appears alert and oriented. Denies any pain or SOB at this time.

## 2017-02-24 NOTE — PHYSICAL THERAPY NOTE
Pt was to be seen for PT treatment session. Consulted w/ RN who said pt is about to begin a meeting for hospice consultation and that now is not a good time. Will re-attempt time permitting if appropriate to see pt.

## 2017-02-24 NOTE — DISCHARGE PLANNING
2/24CMMD orders received in regards to hospice. Please make referral to [facility] for home hospice informational session.  Patient and son Moe Pepper are interested in meeting with [facility] for informational session regarding possible home hospice ca

## 2017-02-24 NOTE — PLAN OF CARE
Pt being transferred to [facility]. Report given to [facility].

## 2017-02-24 NOTE — PROGRESS NOTES
[facility]    [facility] Note    Ashlee Lopez Patient Status:  Inpatient    3/23/1940 MRN [id number]   Location [facility] 4W/SW/SE Attending Dax Alexandra MD   Hosp Day # 16 PCP Samantha Eastman MD     Subjective:     Jess Rosas She exhibits no distension. There is no tenderness. There is no rigidity, no rebound, no guarding and no CVA tenderness. Neurological: She is alert and oriented to person, place, and time. Skin: Skin is warm and dry. She is not diaphoretic.  No erythema classical Hodgkin lymphoma.  - Systemic chemotherapy ( ABVD). -S/P PFTs. 2D echo was performed and EF 60%. S/P port placement. Oncology following pt. Has port R side. For decision regarding chemo.       Colon cancer ([facility] [address]  Near obstructing mass.  On low f

## 2017-02-24 NOTE — CONSULTS
[facility]  Palliative Care Follow Up    [facility] Patient Status:  Inpatient    3/23/1940 MRN [id number]   Location [facility] 4W/SW/SE Attending Lissette Vázquez MD   Hosp Day # 12 PCP Mamie Kitchen MD     Date of Consu hematuria      Hodgkin lymphoma (HCC)      Colon cancer (HCC)      COPD (chronic obstructive pulmonary disease) (HCC)      Acute confusion  -Resolved      CRI (chronic renal insufficiency)      Malnutrition (HCC)  -Albumin 1.9      Insomnia  -Continue Ambi noted in HPI. Objective:  Vital Signs:  Blood pressure 113/41, pulse 83, temperature 98 °F (36.7 °C), temperature source Oral, resp. rate 18, height 5' 7\" (1.702 m), weight 157 lb 1.6 oz (71.26 kg), SpO2 95 %. Body mass index is 24.6 kg/(m^2).   Wes mg, 2 mg, Oral, QID PRN  •  Pantoprazole Sodium (PROTONIX) EC tab 40 mg, 40 mg, Oral, QAM AC  •  Normal Saline Flush 0.9 % injection 10 mL, 10 mL, Intravenous, PRN  •  DiltiaZEM HCl (CARDIZEM) injection 10 mg, 10 mg, Intravenous, Q1H PRN  •  DiltiaZEM HCl with son  Code status: DNR, DNI, and no feeding tube placement  Have advanced directives been discussed with patient or healthcare power of : Yes  Advance Directive: Patient/family asked to bring in     Healthcare Agent Appointed: Yes  Healthcare A

## 2017-02-24 NOTE — HOSPICE RN NOTE
Hospice informational visit. Writer met with patient and son in bed,  Discussed hospice philosophy, goals of care, and proposed plan of care. Answered any questions or concerns during informational session.   Patient and son expressed understanding and

## 2017-02-24 NOTE — PROGRESS NOTES
[facility]  Inpatient Progress Note    Ashlee Lopez Patient Status:  Inpatient    3/23/1940 MRN [id number]   Location [facility] 4W/SW/SE Attending Dax Alexandra MD   Hosp Day # 13 PCP Samantha Eastman MD     Chief Complaint:    Sofia Cardona HCT 28.6 02/23/2017    02/23/2017   CREATSERUM 1.05 02/23/2017   BUN 30 02/23/2017    02/23/2017   K 3.7 02/23/2017   CL 97 02/23/2017   CO2 33 02/23/2017    02/23/2017   CA 8.0 02/23/2017   MG 1.9 02/23/2017              Radiology: ago and today in presence of niece, her  and  . Discussed chemotherapy for HD with ABVD including side effect profile of medications and treatment schedule. Literature was provided.  Explained that given pt's tenuous condition, she is at hi

## 2017-02-25 PROBLEM — D70.9 NEUTROPENIA (HCC): Status: ACTIVE | Noted: 2017-01-01

## 2017-02-25 PROBLEM — R41.0 ACUTE CONFUSION: Status: RESOLVED | Noted: 2017-01-01 | Resolved: 2017-01-01

## 2017-02-25 NOTE — PROGRESS NOTES
[facility]    Progress Note    Elisa Escalante Patient Status:  Inpatient    3/23/1940 MRN [id number]   Location [facility] 4W/SW/SE Attending Demetrio Mcfadden MD   Hosp Day # 16 PCP Abdulaziz Nickerson MD     Subjective:She feels Psychiatric: Her mood appears depressed. Assessment and Plan:     Atrial fibrillation with rapid ventricular response (HCC)  Rate controlled. On digoxin and BB. gtt. Only ASA 81 mg po qam. Prior concerns with bleeding with anticoags.  Hgb now stable

## 2017-02-26 NOTE — PROCEDURES
[facility]    Patient's Name Ash Cole MRN [id number]    3/23/1940 Pulmonologist Nidia Bell MD   Location [facility] 4W/SW/SE PCP William Gilbert MD       The PFTs are Abnormal.    There is severe airway obstruction.

## 2017-02-26 NOTE — PROGRESS NOTES
[facility]    Progress Note    N[place] Lamp Patient Status:  Inpatient    3/23/1940 MRN [id number]   Location [facility] 4W/SW/SE Attending Lissette Vázquez MD   Hosp Day # 25 PCP Mamie Kitchen MD     Subjective:sitting in rigidity, no rebound, no guarding and no CVA tenderness. Neurological: She is alert and oriented to person, place, and time. Skin: Skin is warm and dry. She is not diaphoretic. No erythema. There is pallor.    Psychiatric: Her mood appears depressed  As ESRML 86* 02/18/2017   MG 1.9 02/23/2017   TROP 0.01 02/08/2017   B12 >1500* 02/14/2017                         Jennifer Don MD  2/26/2017

## 2017-02-27 NOTE — PROGRESS NOTES
[place] [facility]    Brief Note    Clint Smith Patient Status:  Inpatient    3/23/1940 MRN [id number]   Location [facility] 4W/SW/SE Attending Marina Jurado MD   Hosp Day # 23 PCP Igor Rodriguez MD       Brief Note:   Devon Whitfield

## 2017-02-27 NOTE — HOSPICE RN NOTE
Met with patient today, signed consents for home hospice. Also spoke with son Tigre Monroe, to plan for discharge. Equipment is ordered for delivery tonight after 7pm. Son states he will need more time to find care giving.     Also spoke with Dr Noelle Brandt, she is in a

## 2017-02-27 NOTE — PLAN OF CARE
CARDIOVASCULAR - ADULT    • Maintains optimal cardiac output and hemodynamic stability Progressing    • Absence of cardiac arrhythmias or at baseline Progressing        NEUROLOGICAL - ADULT    • Absence of seizures Progressing    • Achieves maximal functio

## 2017-02-27 NOTE — PHYSICAL THERAPY NOTE
Pt was to be seen for PT treatment session. Consulted w/ RN prior to seeing pt. Pt was received sitting in recliner chair. Pt declined therapy at this time. Per pt: \"I'm just so tired today. Maybe tomorrow. \"

## 2017-02-27 NOTE — PLAN OF CARE
CARDIOVASCULAR - ADULT    • Maintains optimal cardiac output and hemodynamic stability Progressing    • Absence of cardiac arrhythmias or at baseline Progressing          METABOLIC/FLUID AND ELECTROLYTES - ADULT    • Glucose maintained within prescribed ra

## 2017-02-27 NOTE — PROGRESS NOTES
[facility]    Progress Note    Agnes Olson Patient Status:  Inpatient    3/23/1940 MRN [id number]   Location [facility] 4W/SW/SE Attending Neal Thornton MD   Hosp Day # 23 PCP Nalini Gutierrez MD     Subjective:     Montse Arriaga and no CVA tenderness. Musculoskeletal:   Pt. States she has been getting up and going to bathroom. Pt. Geniaola Bumpers to get up with me and difficulty but did get up by herself. Skin: Skin is warm and dry. She is not diaphoretic. No erythema. No pallor.    Psy lymphoma.  - Systemic chemotherapy ( ABVD). -S/P PFTs. 2D echo was performed and EF 60%. S/P port placement. Oncology following pt. Has port R side. For decision regarding chemo.       Colon cancer ([address]  Near obstructing mass. On low fiber diet.  General 02/23/2017   K 3.8 02/26/2017   CL 97 02/23/2017   CO2 33* 02/23/2017   * 02/23/2017   CA 8.0* 02/23/2017   ALB 2.1* 02/19/2017   ALKPHO 56 02/19/2017   BILT 0.9 02/19/2017   TP 6.8 02/19/2017   AST 25 02/19/2017   ALT 17 02/19/2017   PTT 80.7* 02/2

## 2017-02-28 NOTE — PROGRESS NOTES
[facility]    Progress Note    Ashlee Lopez Patient Status:  Inpatient    3/23/1940 MRN [id number]   Location [facility] 4W/SW/SE Attending Dax Alexandra MD   Hosp Day # 20 PCP Samantha Eastman MD     Subjective:     Jess Rosas mood appears depressed. Assessment and Plan:     Atrial fibrillation with rapid ventricular response (HCC)  Rates are better controlled. On digoxin and BB. Cardiology following pt. Now, off heparin gtt.  Only ASA 81 mg po qam. Prior concerns with blee   COPD (chronic obstructive pulmonary disease) (HCC)  Stable at present.  Nebs. PRN. PFT's noted.       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     Resolved. D/W neurology.  After family meeting today. Ct negative.  Awaitin

## 2017-02-28 NOTE — PHYSICAL THERAPY NOTE
Attempted to see patient for daily PT treatment however patient refused PT treatment d/t fatigued and tired. Talk to RN and per RN pt going home with hospice. If patient stays in hospital will check patient to continued PT treatment. RN aware for that.

## 2017-02-28 NOTE — DISCHARGE PLANNING
2/28CM-This Writer spoke to the Patient's son Ortega Martinez ([phone]) in regards to discharge planning. The Patient's son inquired about the Patient discharging home with hospice.  The Patient's son informed this Writer that he is getting all the items together

## 2017-02-28 NOTE — RESTORATIVE THERAPY
Patient refused Nebulizer therapy. [facility] has educated the patient on the purpose of and need for this therapy as well as potential negative outcomes associated with deferring treatment.  Despite education, patient maintains refusal.

## 2017-03-01 NOTE — HOSPICE RN NOTE
Spoke with Dr Bacilio Murray about pt condition change - complaining of more pain, Roxanol not effective, more lethargic, not eating. At this time, pt discharge home will be cancelled for today.  Dr Bacilio Murray is agreeable to start inpatient hospice care due to more pain

## 2017-03-01 NOTE — HOSPICE RN NOTE
Hospice Inpatient Admission    Met with patient son Emmie Runner to sign new Election of Benefit. Emmie Runner is ready to proceed with inpatient hospice and has re signed consents.   Contacted hospice medical director to receive admission approval for hospice diagnosis o

## 2017-03-01 NOTE — DISCHARGE SUMMARY
Pt was admitted on 02/08/2017 and discharged on 03/01/2017    discharge diagnosis:    Hospice due to advanced HL and Colon CA, orders to be placed by hospice Nurse.       Atrial fibrillation with rapid ventricular response (HCC)  Rates are better controlled regarding chemo.       Colon cancer ([facility] [address]  Near obstructing mass. On low fiber diet. General sx. Following pt.       COPD (chronic obstructive pulmonary disease) (HCC)  Stable at present.  Nebs. PRN.  PFT's noted.       Acute confusion/Hemisperic carotid ar

## 2017-03-02 NOTE — PLAN OF CARE
Pt lethargic, responsive to verbal stimuli, medicated for c/o back pain as per MD order. See MAR. Will continue to monitor.

## 2017-03-02 NOTE — PROGRESS NOTES
[facility]    Progress Note    Clint Smith Patient Status:  Inpatient    3/23/1940 MRN [id number]   Location [facility] 4W/SW/SE Attending Marina Jurado MD   Hosp Day # 1 PCP Igor Rodriguez MD     Subjective:     Hailey Valdez There is no tenderness. There is no rigidity, no rebound, no guarding and no CVA tenderness. Neurological: She is alert and oriented to person, place, and time. Skin: Skin is warm and dry. She is not diaphoretic. No erythema. No pallor.    Psychiatric: ABVD).  -S/P PFTs. 2D echo was performed and EF 60%. S/P port placement. Oncology following pt. Has port R side. For decision regarding chemo.       Colon cancer ([address]  Near obstructing mass. On low fiber diet. General sx.  Following pt.       COPD (chroni H+P.     Based on patients current state of illness, I anticipate that, after discharge, patient will require TBD. Severo Marquez.  Noelle Brandt MD  3/2/2017

## 2017-03-02 NOTE — HOSPICE RN NOTE
Hospice RN Progress Note    HPI Per [facility][note 3] Palliative Service: Vannessa Luz is a 67year old  woman with a h/o COPD who presented to the ED on 02/08/2017 with c/o leg swelling and SOB that had been progressively worsening for the last few months and will reassess in th afternoon. TINO Irwin updated on plan. 1.Physical   Pain: Has MS04 IVP 1mg a1hr PRN. Has MS04 drip at 1mg/hr. Titration orders available on drip up to 5mg based on pain scale. Dyspnea: Has PRN MS04 and PRN Ativan 0.5-2mg q4hr PRN.    Fev

## 2017-03-03 NOTE — H&P
[facility]    History and Physical    Casimer Eisenmenger Patient Status:  Inpatient    3/23/1940 MRN [id number]   Location [facility] 4W/SW/SE Attending Blanca Beltran MD   Hosp Day # 1 PCP Dairus Gilmore MD     Date:  3/2/2017 EVERY 4 HOURS AS NEEDED FOR WHEEZING   Fluticasone Propionate  HFA (FLOVENT HFA) 110 MCG/ACT Inhalation Aerosol Inhale 1 puff into the lungs 2 (two) times daily. Multiple Vitamin (MULTI-VITAMINS) Oral Tab Take  by mouth.        Review of Systems:   Review

## 2017-03-03 NOTE — HOSPICE RN NOTE
Hospice RN Progress Note    HPI Per [facility][note 3] Palliative Service: Josi Felipe is a 67year old  woman with a h/o COPD who presented to the ED on 02/08/2017 with c/o leg swelling and SOB that had been progressively worsening for the last few months and touch, movement. Complaining of pain in abdomen, back and heels. Given PRN morphine x4. Spoke with Dr Jayant Childers, is comfortable with low dose MS04 drip if pain continues, will reassess in th afternoon. TINO Dobson updated on plan.     1.Physical    Pain: Has MS04 I

## 2017-03-03 NOTE — PROGRESS NOTES
[facility] - [facility]    Progress Note    Casimer Eisenmenger Patient Status:  Inpatient    3/23/1940 MRN [id number]   Location [facility] 4W/SW/SE Attending Blanca Beltran MD   Hosp Day # 2 PCP Darius Gilmore MD     Subjective:     Baldemar Sensor Neurological: She displays no atrophy and no tremor. She exhibits normal muscle tone. Patient sleeping comfortably. Awoken to answer questions.   And answer questions appropriately asked for Chapstick for her lips but fall asleep as I was talking to he

## 2017-03-04 NOTE — PROGRESS NOTES
[facility]    Progress Note     Patient Status:  Inpatient    3/23/1940 MRN [id number]   Location [facility] 4W/SW/SE Attending Wen Martin MD   Hosp Day # 3 PCP Tito Manuel MD     Subjective:   Alycia Briscoe

## 2017-03-04 NOTE — HOSPICE RN NOTE
Hospice RN progress note  3.4.17  Received patient in bed, patient is arousable to touch. Patient does not appear to be in any pain or distress currently. Patients IV Drip morphine has been increased to 1mg/hr which has kept patient comfortable.  No break

## 2017-03-05 NOTE — PROGRESS NOTES
[facility]    Progress Note    Karon Fothergill Patient Status:  Inpatient    3/23/1940 MRN [id number]   Location [facility] 4W/SW/SE Attending Geri Adan MD   Hosp Day # 4 PCP Abiodun Morgan MD     Subjective:   Phillip Barone

## 2017-03-05 NOTE — PLAN OF CARE
COPING    • Pt/Family able to verbalize concerns and demonstrate effective coping strategies Progressing        DEATH & DYING    • Pt/Family communicate acceptance of impending death and feel psychological comfort and peace Progressing        PAIN - ADULT

## 2017-03-10 NOTE — DISCHARGE SUMMARY
[facility] - [facility]    Discharge Summary    Lolis Points Patient Status:  Inpatient    3/23/1940 MRN [id number]   Location [facility] 4W/SW/SE Attending No att. providers found   Hosp Day # 4 PCP Chloe Garza MD     Date of  Drive (FLOVENT HFA) 110 MCG/ACT Inhalation Aerosol  Inhale 1 puff into the lungs 2 (two) times daily. , Normal, Disp-1 Inhaler, R-6    Multiple Vitamin (MULTI-VITAMINS) Oral Tab  Take  by mouth., Historical    Samantha Prime  3/9/2017

## 2022-08-17 NOTE — CM/SW NOTE
[facility]  met with patient in order to provide support and encouragement. Family not present at bedside. Pt states she has pain in her back and her heels. Denies SOB but seems to be struggling with mentation.   Pt reports no appetite at t Billing Type: Third-Party Bill

## (undated) DEVICE — ENDOSCOPY PACK - LOWER: Brand: MEDLINE INDUSTRIES, INC.

## (undated) DEVICE — SNARE CAPTIFLEX MICRO-OVL OLY

## (undated) DEVICE — FORCEP RADIAL JAW 4

## (undated) DEVICE — Device: Brand: DEFENDO AIR/WATER/SUCTION AND BIOPSY VALVE

## (undated) DEVICE — TRAP 4 CPTR CHMBR N EZ INLN

## (undated) DEVICE — CLIP RESOLUTION 235CM

## (undated) NOTE — LETTER
[facility]  Authorization for Invasive Procedures  1.  I hereby authorize Dr. Carlotta Cheadle** , my physician and whomever may be designated as the doctor's assistant, to perform the following operation and/or procedure:  *Inserti performed for the purposes of advancing medicine, science, and/or education, provided my identity is not revealed. If the procedure has been videotaped, the physician/surgeon will obtain the original videotape.  The hospital will not be responsible for stor My signature below affirms that prior to the time of the procedure, I have explained to the patient and/or her legal representative, the risks and benefits involved in the proposed treatment and any reasonable alternative to the proposed treatment.  I have

## (undated) NOTE — LETTER
[facility]  Administration of Anesthesia  1. Sylwia Hagan, or _________________________________ acting on his/her behalf, (Patient) (Dependent/Representative) request to receive anesthesia for my pending procedure/operation/treatment. pressure, difficulty urinating, slowing of the baby's heart rate and headache. Rare risks include infections, high spinal         block, spinal bleeding, seizure, cardiac arrest and death.   7.   AWARENESS: I understand that it is possible (but unlike ________________________________________________  (Date) (Time)                                                                                                  (Responsible person in case of minor/ unconscious pt) /Relationship    My signature below affir